# Patient Record
Sex: MALE | Race: WHITE | NOT HISPANIC OR LATINO | URBAN - METROPOLITAN AREA
[De-identification: names, ages, dates, MRNs, and addresses within clinical notes are randomized per-mention and may not be internally consistent; named-entity substitution may affect disease eponyms.]

---

## 2017-03-22 ENCOUNTER — OUTPATIENT (OUTPATIENT)
Dept: OUTPATIENT SERVICES | Facility: HOSPITAL | Age: 62
LOS: 1 days | End: 2017-03-22
Payer: COMMERCIAL

## 2017-03-22 VITALS
DIASTOLIC BLOOD PRESSURE: 70 MMHG | WEIGHT: 201.94 LBS | TEMPERATURE: 98 F | HEIGHT: 64 IN | OXYGEN SATURATION: 95 % | RESPIRATION RATE: 14 BRPM | HEART RATE: 67 BPM | SYSTOLIC BLOOD PRESSURE: 180 MMHG

## 2017-03-22 DIAGNOSIS — Z98.89 OTHER SPECIFIED POSTPROCEDURAL STATES: Chronic | ICD-10-CM

## 2017-03-22 DIAGNOSIS — S93.312A SUBLUXATION OF TARSAL JOINT OF LEFT FOOT, INITIAL ENCOUNTER: ICD-10-CM

## 2017-03-22 DIAGNOSIS — Z01.818 ENCOUNTER FOR OTHER PREPROCEDURAL EXAMINATION: ICD-10-CM

## 2017-03-22 DIAGNOSIS — Z90.89 ACQUIRED ABSENCE OF OTHER ORGANS: Chronic | ICD-10-CM

## 2017-03-22 DIAGNOSIS — M12.572 TRAUMATIC ARTHROPATHY, LEFT ANKLE AND FOOT: ICD-10-CM

## 2017-03-22 DIAGNOSIS — S93.322A SUBLUXATION OF TARSOMETATARSAL JOINT OF LEFT FOOT, INITIAL ENCOUNTER: ICD-10-CM

## 2017-03-22 DIAGNOSIS — S99.929A UNSPECIFIED INJURY OF UNSPECIFIED FOOT, INITIAL ENCOUNTER: ICD-10-CM

## 2017-03-22 LAB
ANION GAP SERPL CALC-SCNC: 8 MMOL/L — SIGNIFICANT CHANGE UP (ref 5–17)
BUN SERPL-MCNC: 24 MG/DL — HIGH (ref 7–23)
CALCIUM SERPL-MCNC: 9.6 MG/DL — SIGNIFICANT CHANGE UP (ref 8.4–10.5)
CHLORIDE SERPL-SCNC: 106 MMOL/L — SIGNIFICANT CHANGE UP (ref 96–108)
CO2 SERPL-SCNC: 32 MMOL/L — HIGH (ref 22–31)
CREAT SERPL-MCNC: 1.4 MG/DL — HIGH (ref 0.5–1.3)
GLUCOSE SERPL-MCNC: 101 MG/DL — HIGH (ref 70–99)
HCT VFR BLD CALC: 47.7 % — SIGNIFICANT CHANGE UP (ref 39–50)
HGB BLD-MCNC: 16.6 G/DL — SIGNIFICANT CHANGE UP (ref 13–17)
MCHC RBC-ENTMCNC: 29.2 PG — SIGNIFICANT CHANGE UP (ref 27–34)
MCHC RBC-ENTMCNC: 34.9 GM/DL — SIGNIFICANT CHANGE UP (ref 32–36)
MCV RBC AUTO: 83.5 FL — SIGNIFICANT CHANGE UP (ref 80–100)
PLATELET # BLD AUTO: 254 K/UL — SIGNIFICANT CHANGE UP (ref 150–400)
POTASSIUM SERPL-MCNC: 3 MMOL/L — LOW (ref 3.5–5.3)
POTASSIUM SERPL-SCNC: 3 MMOL/L — LOW (ref 3.5–5.3)
RBC # BLD: 5.71 M/UL — SIGNIFICANT CHANGE UP (ref 4.2–5.8)
RBC # FLD: 13.1 % — SIGNIFICANT CHANGE UP (ref 10.3–14.5)
SODIUM SERPL-SCNC: 146 MMOL/L — HIGH (ref 135–145)
WBC # BLD: 8.1 K/UL — SIGNIFICANT CHANGE UP (ref 3.8–10.5)
WBC # FLD AUTO: 8.1 K/UL — SIGNIFICANT CHANGE UP (ref 3.8–10.5)

## 2017-03-22 PROCEDURE — G0463: CPT

## 2017-03-22 PROCEDURE — 80048 BASIC METABOLIC PNL TOTAL CA: CPT

## 2017-03-22 PROCEDURE — 93010 ELECTROCARDIOGRAM REPORT: CPT | Mod: NC

## 2017-03-22 PROCEDURE — 93005 ELECTROCARDIOGRAM TRACING: CPT

## 2017-03-22 PROCEDURE — 85027 COMPLETE CBC AUTOMATED: CPT

## 2017-03-22 NOTE — H&P PST ADULT - FAMILY HISTORY
Father  Still living? No  Family history of stroke, Age at diagnosis: Age Unknown     Mother  Still living? No  Family history of heart attack, Age at diagnosis: Age Unknown     Aunt  Still living? No  Family history of lung cancer, Age at diagnosis: Age Unknown

## 2017-03-22 NOTE — H&P PST ADULT - HISTORY OF PRESENT ILLNESS
62 yo male is scheduled for "Left midfoot fusion/ORIF left tmt dislocation ORIF left tarsal dislocation" on 3/31/17 with Rajan Ji MD.  S/P fall 2/18/17 on stairs, states diagnosed with left foot tendon injury.  Non weight bearing, wearing CAM boot, uses cane for stability.  Denies pain, no swelling.

## 2017-03-22 NOTE — H&P PST ADULT - NEGATIVE ENMT SYMPTOMS
no nasal obstruction/no vertigo/no dysphagia/no recurrent cold sores/no post-nasal discharge/no abnormal taste sensation/no nasal congestion/no tinnitus/no ear pain/no throat pain/no sinus symptoms/no nose bleeds/no dry mouth/no nasal discharge/no hearing difficulty/no gum bleeding

## 2017-03-22 NOTE — H&P PST ADULT - PROBLEM SELECTOR PLAN 1
"left midfoot fusion/ORIF TMT dislocation ORIF left tarsal dislocation" on 3/31/17.  Patient to obtain medical clearance.  pre op instructions were reviewed and signed.    Respiratory consult requested.

## 2017-03-22 NOTE — H&P PST ADULT - NEGATIVE OPHTHALMOLOGIC SYMPTOMS
no loss of vision R/no diplopia/no blurred vision R/no photophobia/no blurred vision L/no loss of vision L

## 2017-03-22 NOTE — H&P PST ADULT - NEGATIVE ALLERGY TYPES
no reactions to food/no indoor environmental allergies/no reactions to medicines/no outdoor environmental allergies

## 2017-03-30 ENCOUNTER — TRANSCRIPTION ENCOUNTER (OUTPATIENT)
Age: 62
End: 2017-03-30

## 2017-03-30 RX ORDER — POTASSIUM CHLORIDE 20 MEQ
1 PACKET (EA) ORAL
Qty: 0 | Refills: 0 | COMMUNITY
Start: 2017-03-30

## 2017-03-31 ENCOUNTER — INPATIENT (INPATIENT)
Facility: HOSPITAL | Age: 62
LOS: 3 days | Discharge: HOME | DRG: 505 | End: 2017-04-04
Attending: ORTHOPAEDIC SURGERY | Admitting: ORTHOPAEDIC SURGERY
Payer: COMMERCIAL

## 2017-03-31 VITALS
HEART RATE: 66 BPM | TEMPERATURE: 98 F | HEIGHT: 64 IN | DIASTOLIC BLOOD PRESSURE: 87 MMHG | SYSTOLIC BLOOD PRESSURE: 196 MMHG | WEIGHT: 199.74 LBS | OXYGEN SATURATION: 100 % | RESPIRATION RATE: 20 BRPM

## 2017-03-31 DIAGNOSIS — M12.572 TRAUMATIC ARTHROPATHY, LEFT ANKLE AND FOOT: ICD-10-CM

## 2017-03-31 DIAGNOSIS — Z98.89 OTHER SPECIFIED POSTPROCEDURAL STATES: Chronic | ICD-10-CM

## 2017-03-31 DIAGNOSIS — Z90.89 ACQUIRED ABSENCE OF OTHER ORGANS: Chronic | ICD-10-CM

## 2017-03-31 DIAGNOSIS — S93.322A SUBLUXATION OF TARSOMETATARSAL JOINT OF LEFT FOOT, INITIAL ENCOUNTER: ICD-10-CM

## 2017-03-31 DIAGNOSIS — S93.312A SUBLUXATION OF TARSAL JOINT OF LEFT FOOT, INITIAL ENCOUNTER: ICD-10-CM

## 2017-03-31 DIAGNOSIS — Z01.818 ENCOUNTER FOR OTHER PREPROCEDURAL EXAMINATION: ICD-10-CM

## 2017-03-31 LAB
POTASSIUM SERPL-MCNC: 3.4 MMOL/L — LOW (ref 3.5–5.3)
POTASSIUM SERPL-SCNC: 3.4 MMOL/L — LOW (ref 3.5–5.3)

## 2017-03-31 PROCEDURE — 99223 1ST HOSP IP/OBS HIGH 75: CPT

## 2017-03-31 RX ORDER — HYDRALAZINE HCL 50 MG
10 TABLET ORAL DAILY
Qty: 0 | Refills: 0 | Status: COMPLETED | OUTPATIENT
Start: 2017-03-31 | End: 2017-03-31

## 2017-03-31 RX ORDER — ACEBUTOLOL HCL 200 MG
400 CAPSULE ORAL
Qty: 0 | Refills: 0 | Status: DISCONTINUED | OUTPATIENT
Start: 2017-03-31 | End: 2017-04-04

## 2017-03-31 RX ORDER — AMLODIPINE BESYLATE 2.5 MG/1
10 TABLET ORAL DAILY
Qty: 0 | Refills: 0 | Status: DISCONTINUED | OUTPATIENT
Start: 2017-04-01 | End: 2017-04-04

## 2017-03-31 RX ORDER — METOPROLOL TARTRATE 50 MG
2.5 TABLET ORAL ONCE
Qty: 0 | Refills: 0 | Status: COMPLETED | OUTPATIENT
Start: 2017-03-31 | End: 2017-03-31

## 2017-03-31 RX ORDER — OXYCODONE HYDROCHLORIDE 5 MG/1
10 TABLET ORAL EVERY 4 HOURS
Qty: 0 | Refills: 0 | Status: DISCONTINUED | OUTPATIENT
Start: 2017-03-31 | End: 2017-04-03

## 2017-03-31 RX ORDER — SODIUM CHLORIDE 9 MG/ML
1000 INJECTION, SOLUTION INTRAVENOUS
Qty: 0 | Refills: 0 | Status: DISCONTINUED | OUTPATIENT
Start: 2017-03-31 | End: 2017-03-31

## 2017-03-31 RX ORDER — DIPHENHYDRAMINE HCL 50 MG
25 CAPSULE ORAL ONCE
Qty: 0 | Refills: 0 | Status: COMPLETED | OUTPATIENT
Start: 2017-03-31 | End: 2017-03-31

## 2017-03-31 RX ORDER — DOCUSATE SODIUM 100 MG
100 CAPSULE ORAL
Qty: 0 | Refills: 0 | Status: DISCONTINUED | OUTPATIENT
Start: 2017-03-31 | End: 2017-04-04

## 2017-03-31 RX ORDER — POTASSIUM CHLORIDE 20 MEQ
20 PACKET (EA) ORAL DAILY
Qty: 0 | Refills: 0 | Status: DISCONTINUED | OUTPATIENT
Start: 2017-03-31 | End: 2017-04-04

## 2017-03-31 RX ORDER — CEFAZOLIN SODIUM 1 G
2000 VIAL (EA) INJECTION ONCE
Qty: 0 | Refills: 0 | Status: COMPLETED | OUTPATIENT
Start: 2017-03-31 | End: 2017-03-31

## 2017-03-31 RX ORDER — ACETAMINOPHEN 500 MG
1000 TABLET ORAL EVERY 6 HOURS
Qty: 0 | Refills: 0 | Status: COMPLETED | OUTPATIENT
Start: 2017-03-31 | End: 2017-04-01

## 2017-03-31 RX ORDER — POLYETHYLENE GLYCOL 3350 17 G/17G
17 POWDER, FOR SOLUTION ORAL ONCE
Qty: 0 | Refills: 0 | Status: DISCONTINUED | OUTPATIENT
Start: 2017-03-31 | End: 2017-04-04

## 2017-03-31 RX ORDER — MAGNESIUM HYDROXIDE 400 MG/1
30 TABLET, CHEWABLE ORAL DAILY
Qty: 0 | Refills: 0 | Status: DISCONTINUED | OUTPATIENT
Start: 2017-03-31 | End: 2017-04-04

## 2017-03-31 RX ORDER — AMLODIPINE BESYLATE 2.5 MG/1
10 TABLET ORAL ONCE
Qty: 0 | Refills: 0 | Status: COMPLETED | OUTPATIENT
Start: 2017-03-31 | End: 2017-03-31

## 2017-03-31 RX ORDER — VANCOMYCIN HCL 1 G
1500 VIAL (EA) INTRAVENOUS ONCE
Qty: 0 | Refills: 0 | Status: COMPLETED | OUTPATIENT
Start: 2017-03-31 | End: 2017-03-31

## 2017-03-31 RX ORDER — SODIUM CHLORIDE 9 MG/ML
1000 INJECTION, SOLUTION INTRAVENOUS
Qty: 0 | Refills: 0 | Status: DISCONTINUED | OUTPATIENT
Start: 2017-03-31 | End: 2017-04-01

## 2017-03-31 RX ORDER — ENOXAPARIN SODIUM 100 MG/ML
40 INJECTION SUBCUTANEOUS DAILY
Qty: 0 | Refills: 0 | Status: DISCONTINUED | OUTPATIENT
Start: 2017-04-01 | End: 2017-04-04

## 2017-03-31 RX ORDER — ACETAMINOPHEN 500 MG
1000 TABLET ORAL EVERY 8 HOURS
Qty: 0 | Refills: 0 | Status: DISCONTINUED | OUTPATIENT
Start: 2017-04-01 | End: 2017-04-04

## 2017-03-31 RX ORDER — LOSARTAN POTASSIUM 100 MG/1
100 TABLET, FILM COATED ORAL DAILY
Qty: 0 | Refills: 0 | Status: DISCONTINUED | OUTPATIENT
Start: 2017-04-02 | End: 2017-04-04

## 2017-03-31 RX ORDER — HYDROMORPHONE HYDROCHLORIDE 2 MG/ML
0.5 INJECTION INTRAMUSCULAR; INTRAVENOUS; SUBCUTANEOUS
Qty: 0 | Refills: 0 | Status: DISCONTINUED | OUTPATIENT
Start: 2017-03-31 | End: 2017-03-31

## 2017-03-31 RX ORDER — ONDANSETRON 8 MG/1
4 TABLET, FILM COATED ORAL ONCE
Qty: 0 | Refills: 0 | Status: DISCONTINUED | OUTPATIENT
Start: 2017-03-31 | End: 2017-03-31

## 2017-03-31 RX ORDER — OXYCODONE HYDROCHLORIDE 5 MG/1
5 TABLET ORAL EVERY 4 HOURS
Qty: 0 | Refills: 0 | Status: DISCONTINUED | OUTPATIENT
Start: 2017-03-31 | End: 2017-04-03

## 2017-03-31 RX ORDER — HYDROMORPHONE HYDROCHLORIDE 2 MG/ML
0.5 INJECTION INTRAMUSCULAR; INTRAVENOUS; SUBCUTANEOUS
Qty: 0 | Refills: 0 | Status: DISCONTINUED | OUTPATIENT
Start: 2017-03-31 | End: 2017-04-03

## 2017-03-31 RX ORDER — CEFAZOLIN SODIUM 1 G
2000 VIAL (EA) INJECTION EVERY 8 HOURS
Qty: 0 | Refills: 0 | Status: DISCONTINUED | OUTPATIENT
Start: 2017-03-31 | End: 2017-03-31

## 2017-03-31 RX ADMIN — SODIUM CHLORIDE 80 MILLILITER(S): 9 INJECTION, SOLUTION INTRAVENOUS at 19:08

## 2017-03-31 RX ADMIN — AMLODIPINE BESYLATE 10 MILLIGRAM(S): 2.5 TABLET ORAL at 22:59

## 2017-03-31 RX ADMIN — Medication 0.1 MILLIGRAM(S): at 22:59

## 2017-03-31 RX ADMIN — Medication 1000 MILLIGRAM(S): at 23:35

## 2017-03-31 RX ADMIN — Medication 20 MILLIEQUIVALENT(S): at 21:02

## 2017-03-31 RX ADMIN — HYDROMORPHONE HYDROCHLORIDE 0.5 MILLIGRAM(S): 2 INJECTION INTRAMUSCULAR; INTRAVENOUS; SUBCUTANEOUS at 16:03

## 2017-03-31 RX ADMIN — Medication 400 MILLIGRAM(S): at 19:07

## 2017-03-31 RX ADMIN — HYDROMORPHONE HYDROCHLORIDE 0.5 MILLIGRAM(S): 2 INJECTION INTRAMUSCULAR; INTRAVENOUS; SUBCUTANEOUS at 16:40

## 2017-03-31 RX ADMIN — Medication 100 MILLIGRAM(S): at 20:48

## 2017-03-31 RX ADMIN — Medication 300 MILLIGRAM(S): at 19:35

## 2017-03-31 RX ADMIN — Medication 1000 MILLIGRAM(S): at 21:06

## 2017-03-31 RX ADMIN — Medication 25 MILLIGRAM(S): at 19:07

## 2017-03-31 RX ADMIN — Medication 10 MILLIGRAM(S): at 16:00

## 2017-03-31 RX ADMIN — Medication 2.5 MILLIGRAM(S): at 15:05

## 2017-03-31 RX ADMIN — HYDROMORPHONE HYDROCHLORIDE 0.5 MILLIGRAM(S): 2 INJECTION INTRAMUSCULAR; INTRAVENOUS; SUBCUTANEOUS at 16:20

## 2017-03-31 RX ADMIN — HYDROMORPHONE HYDROCHLORIDE 0.5 MILLIGRAM(S): 2 INJECTION INTRAMUSCULAR; INTRAVENOUS; SUBCUTANEOUS at 15:55

## 2017-03-31 RX ADMIN — SODIUM CHLORIDE 100 MILLILITER(S): 9 INJECTION, SOLUTION INTRAVENOUS at 15:00

## 2017-03-31 RX ADMIN — HYDROMORPHONE HYDROCHLORIDE 0.5 MILLIGRAM(S): 2 INJECTION INTRAMUSCULAR; INTRAVENOUS; SUBCUTANEOUS at 15:17

## 2017-03-31 NOTE — PROGRESS NOTE ADULT - SUBJECTIVE AND OBJECTIVE BOX
POST OPERATIVE DAY #: 0    61y Male  s/p  Left    foot fusion                    SUBJECTIVE: Patient seen and examined at bedside.     Pain:  well controlled       Denies CP, SOB, N/V/D, weakness, numbness   No new complains     OBJECTIVE:     Vital Signs Last 24 Hrs  T(C): 36.8, Max: 37 (03-31 @ 14:59)  T(F): 98.2, Max: 98.6 (03-31 @ 14:59)  HR: 90 (64 - 94)  BP: 180/78 (174/84 - 209/81)  BP(mean): --  RR: 18 (13 - 20)  SpO2: 99% (95% - 100%)      Left arm: + papular erymatous rash extending proximately, warm to touch    Affected extremity: LLE         Splint: clean/dry/intact          Sensation: intact to light touch          Motor exam: wiggling toes well.           warm, well-perfused; capillary refill < 3 seconds                  MEDICATIONS:  Infection prophylaxis:  ceFAZolin   IVPB 2000milliGRAM(s) IV Intermittent every 8 hours    Pain management:  oxyCODONE IR 10milliGRAM(s) Oral every 4 hours PRN  oxyCODONE IR 5milliGRAM(s) Oral every 4 hours PRN  HYDROmorphone  Injectable 0.5milliGRAM(s) IV Push every 3 hours PRN  acetaminophen  IVPB. 1000milliGRAM(s) IV Intermittent every 6 hours    DVT prophylaxis:     Lovenox 40mg SQ daily    RADIOLOGY & ADDITIONAL STUDIES:    ASSESSMENT AND PLAN:     -Left arm rash: Benadryl   - Analgesic pain control  - DVT prophylaxis: Lovenox 40mg SQ daily    SCDs       - Antibiotics:  change Ancef to Vancomycin for 24 hours   -  PT/OT: Weight Bearing Status:  Nonweight bearing, OOBTC       -  Incentive spirometry  - IVF  - Advance diet as tolerated  -  Follow up labs  -  Disposition:  Subacute Rehab

## 2017-03-31 NOTE — CONSULT NOTE ADULT - ASSESSMENT
61 male s/p    1. foot surgery: opiates prn + bowel rx    2. uncontrolled HTN: may need to give clonidine dose earlier. continue other home meds. hydralazine 10 mg iv prn SBP>180    3. CKD III / hypokalemia: renal dose meds. f/up BMP. replete lytes prn    4. lovenox for dvt ppx    5. rehab possibly tomorrow or Monday.  d/w ortho

## 2017-03-31 NOTE — CONSULT NOTE ADULT - SUBJECTIVE AND OBJECTIVE BOX
61 male s/p foot surgery for non-healing fracture due to poor NWB compliance prior.  Pain 5/10.  Seen in PACU - a bit drowsy.  SBP 190s-200s.    Allergies/Medications:   Allergies:        Allergies:  	No Known Allergies:     Home Medications:   * Patient Currently Takes Medications as of 22-Mar-2017 13:14 documented in Prescription Writer  · 	acebutolol 400 mg oral capsule: Last Dose Taken:  ,  orally 2 times a day  · 	Hyzaar 100 mg-25 mg oral tablet: Last Dose Taken:  , 1 tab(s) orally once a day  · 	Norvasc 10 mg oral tablet: Last Dose Taken:  , 1 tab(s) orally once a day  · 	cloNIDine 0.1 mg oral tablet: Last Dose Taken:  , 1 tab(s) orally 2 times a day at bedtime           . .    PMH/PSH/FH/SH:   Past Medical History:  Foot injury  left  Hypertension    Obesity (BMI 30.0-34.9)    Sleep apnea  doesn't use CPAP.    Past Surgical History:  H/O knee surgery  left, 2013  H/O oral surgery    History of tonsillectomy  childhood.    Family History:  Father  Still living? No  Family history of stroke, Age at diagnosis: Age Unknown     Mother  Still living? No  Family history of heart attack, Age at diagnosis: Age Unknown     Aunt  Still living? No  Family history of lung cancer, Age at diagnosis: Age Unknown.    Social History:  · Marital Status		  · Occupation	retired	  · Lives With	children; spouse	        Creatinine 1.4 earlier this month.  No labs today.

## 2017-04-01 DIAGNOSIS — N18.9 CHRONIC KIDNEY DISEASE, UNSPECIFIED: ICD-10-CM

## 2017-04-01 DIAGNOSIS — I10 ESSENTIAL (PRIMARY) HYPERTENSION: ICD-10-CM

## 2017-04-01 DIAGNOSIS — M79.673 PAIN IN UNSPECIFIED FOOT: ICD-10-CM

## 2017-04-01 LAB
ANION GAP SERPL CALC-SCNC: 7 MMOL/L — SIGNIFICANT CHANGE UP (ref 5–17)
BUN SERPL-MCNC: 11 MG/DL — SIGNIFICANT CHANGE UP (ref 7–23)
CALCIUM SERPL-MCNC: 9.3 MG/DL — SIGNIFICANT CHANGE UP (ref 8.4–10.5)
CHLORIDE SERPL-SCNC: 106 MMOL/L — SIGNIFICANT CHANGE UP (ref 96–108)
CO2 SERPL-SCNC: 28 MMOL/L — SIGNIFICANT CHANGE UP (ref 22–31)
CREAT SERPL-MCNC: 1.06 MG/DL — SIGNIFICANT CHANGE UP (ref 0.5–1.3)
GLUCOSE SERPL-MCNC: 101 MG/DL — HIGH (ref 70–99)
HCT VFR BLD CALC: 41.8 % — SIGNIFICANT CHANGE UP (ref 39–50)
HGB BLD-MCNC: 14.1 G/DL — SIGNIFICANT CHANGE UP (ref 13–17)
MCHC RBC-ENTMCNC: 29.1 PG — SIGNIFICANT CHANGE UP (ref 27–34)
MCHC RBC-ENTMCNC: 33.8 GM/DL — SIGNIFICANT CHANGE UP (ref 32–36)
MCV RBC AUTO: 86.3 FL — SIGNIFICANT CHANGE UP (ref 80–100)
PLATELET # BLD AUTO: 236 K/UL — SIGNIFICANT CHANGE UP (ref 150–400)
POTASSIUM SERPL-MCNC: 3.5 MMOL/L — SIGNIFICANT CHANGE UP (ref 3.5–5.3)
POTASSIUM SERPL-SCNC: 3.5 MMOL/L — SIGNIFICANT CHANGE UP (ref 3.5–5.3)
RBC # BLD: 4.84 M/UL — SIGNIFICANT CHANGE UP (ref 4.2–5.8)
RBC # FLD: 13.4 % — SIGNIFICANT CHANGE UP (ref 10.3–14.5)
SODIUM SERPL-SCNC: 141 MMOL/L — SIGNIFICANT CHANGE UP (ref 135–145)
WBC # BLD: 13 K/UL — HIGH (ref 3.8–10.5)
WBC # FLD AUTO: 13 K/UL — HIGH (ref 3.8–10.5)

## 2017-04-01 PROCEDURE — 99232 SBSQ HOSP IP/OBS MODERATE 35: CPT

## 2017-04-01 RX ORDER — HYDRALAZINE HCL 50 MG
10 TABLET ORAL
Qty: 0 | Refills: 0 | Status: DISCONTINUED | OUTPATIENT
Start: 2017-04-01 | End: 2017-04-02

## 2017-04-01 RX ORDER — DIPHENHYDRAMINE HCL 50 MG
25 CAPSULE ORAL EVERY 8 HOURS
Qty: 0 | Refills: 0 | Status: DISCONTINUED | OUTPATIENT
Start: 2017-04-01 | End: 2017-04-04

## 2017-04-01 RX ADMIN — Medication 400 MILLIGRAM(S): at 00:34

## 2017-04-01 RX ADMIN — OXYCODONE HYDROCHLORIDE 10 MILLIGRAM(S): 5 TABLET ORAL at 23:30

## 2017-04-01 RX ADMIN — Medication 400 MILLIGRAM(S): at 06:00

## 2017-04-01 RX ADMIN — Medication 1000 MILLIGRAM(S): at 19:26

## 2017-04-01 RX ADMIN — OXYCODONE HYDROCHLORIDE 5 MILLIGRAM(S): 5 TABLET ORAL at 19:30

## 2017-04-01 RX ADMIN — OXYCODONE HYDROCHLORIDE 10 MILLIGRAM(S): 5 TABLET ORAL at 23:00

## 2017-04-01 RX ADMIN — ENOXAPARIN SODIUM 40 MILLIGRAM(S): 100 INJECTION SUBCUTANEOUS at 08:53

## 2017-04-01 RX ADMIN — Medication 0.1 MILLIGRAM(S): at 06:00

## 2017-04-01 RX ADMIN — OXYCODONE HYDROCHLORIDE 10 MILLIGRAM(S): 5 TABLET ORAL at 00:12

## 2017-04-01 RX ADMIN — Medication 400 MILLIGRAM(S): at 18:02

## 2017-04-01 RX ADMIN — Medication 400 MILLIGRAM(S): at 06:01

## 2017-04-01 RX ADMIN — Medication 100 MILLIGRAM(S): at 06:00

## 2017-04-01 RX ADMIN — Medication 1000 MILLIGRAM(S): at 19:23

## 2017-04-01 RX ADMIN — Medication 0.1 MILLIGRAM(S): at 18:02

## 2017-04-01 RX ADMIN — Medication 20 MILLIEQUIVALENT(S): at 11:29

## 2017-04-01 RX ADMIN — OXYCODONE HYDROCHLORIDE 10 MILLIGRAM(S): 5 TABLET ORAL at 11:27

## 2017-04-01 RX ADMIN — OXYCODONE HYDROCHLORIDE 5 MILLIGRAM(S): 5 TABLET ORAL at 15:41

## 2017-04-01 RX ADMIN — OXYCODONE HYDROCHLORIDE 5 MILLIGRAM(S): 5 TABLET ORAL at 19:26

## 2017-04-01 RX ADMIN — Medication 1000 MILLIGRAM(S): at 11:27

## 2017-04-01 RX ADMIN — OXYCODONE HYDROCHLORIDE 5 MILLIGRAM(S): 5 TABLET ORAL at 16:30

## 2017-04-01 RX ADMIN — Medication 100 MILLIGRAM(S): at 18:02

## 2017-04-01 RX ADMIN — Medication 1000 MILLIGRAM(S): at 11:31

## 2017-04-01 RX ADMIN — OXYCODONE HYDROCHLORIDE 5 MILLIGRAM(S): 5 TABLET ORAL at 07:36

## 2017-04-01 RX ADMIN — Medication 25 MILLIGRAM(S): at 10:35

## 2017-04-01 RX ADMIN — OXYCODONE HYDROCHLORIDE 5 MILLIGRAM(S): 5 TABLET ORAL at 08:25

## 2017-04-01 RX ADMIN — Medication 1000 MILLIGRAM(S): at 06:01

## 2017-04-01 NOTE — OCCUPATIONAL THERAPY INITIAL EVALUATION ADULT - ADDITIONAL COMMENTS
Lives with wife. 2 steps to enter with handrail. No steps inside. Has both a tub with curtains and small stall shower. Pt was given crutches after fall but said he was never trained and didn't use. has a knee scooter rental that he returned.

## 2017-04-01 NOTE — PROGRESS NOTE ADULT - SUBJECTIVE AND OBJECTIVE BOX
Pt seen and examined. Doing well.  PE: LLE  Dsg c/d/i. +Flex and ext toes; SPLT toes; toes WWP  A/P: s/p L Midfoot fusion  NWB LLE in splint  Ice/elelvation  Lovenox   Hold NSAIDs for fusion  Pain control  f/up med recs  D/c pending PT/OT eval  f/up 2 weeks. leave dressing/splint in place until f/up appt

## 2017-04-01 NOTE — PROGRESS NOTE ADULT - ASSESSMENT
Patient is 62 yo male with hx of left Foot injury, Hypertension, Obesity (BMI 30.0-34.9), and Sleep apnea  doesn't use CPAP presenting with .

## 2017-04-01 NOTE — PHYSICAL THERAPY INITIAL EVALUATION ADULT - ADDITIONAL COMMENTS
Pt has 2 CATALINO +HR into private home. No steps inside home. Pt owns bilateral AC's. Pt had knee rental scooter but he returned it.

## 2017-04-01 NOTE — PHYSICAL THERAPY INITIAL EVALUATION ADULT - GAIT TRAINING, PT EVAL
ambulate with a RW for 50 feet NWB LLE and cgAx1 in 3-5 days. 2 steps HR/AC with cgA x 1 in 3-5 days

## 2017-04-01 NOTE — PHYSICAL THERAPY INITIAL EVALUATION ADULT - RANGE OF MOTION EXAMINATION, REHAB EVAL
Right LE ROM was WFL (within functional limits)/deficits as listed below/LLE n/t due to recent sx/bilateral upper extremity ROM was WNL (within normal limits)

## 2017-04-01 NOTE — PROGRESS NOTE ADULT - SUBJECTIVE AND OBJECTIVE BOX
POST OPERATIVE DAY #:  [ 1]   STATUS POST: [X ] Left [ ] Right  [ ]TKA [ ]THR    Foot fusion w orif TMT    Patient is a 61y old  Male who presents with a chief complaint of torn tendon left foot , having pain left foot (30 Mar 2017 17:59)      Pain well controlled    OBJECTIVE:     Vital Signs Last 24 Hrs  T(C): 37.1, Max: 37.1 (04-01 @ 07:28)  T(F): 98.8, Max: 98.8 (04-01 @ 07:28)  HR: 78 (64 - 94)  BP: 169/80 (160/83 - 209/81)  BP(mean): --  RR: 16 (13 - 18)  SpO2: 94% (94% - 100%)    Affected extremity:          Dressing:  splint clean/dry/intact  [ ] Other:           Sensation; intact to light touch            Motor exam: Toes warm and mobile well perfused;  +capillary refill         No calf tenderness bilateral LE's    LABS:                        14.1   13.0  )-----------( 236      ( 01 Apr 2017 06:58 )             41.8     01 Apr 2017 06:58    141    |  106    |  11     ----------------------------<  101    3.5     |  28     |  1.06     Ca    9.3        01 Apr 2017 06:58              A/P :    -    Analgesics PRN  -    DVT ppx: [ ]ASA81 [ ] PGH299 [ ] Lovenox [ ] Coumadin   [ ] Eliquis   -    AM labs ok  -    Weight bearing status: WBAT [ ]        PWB    [ ]     TTWB  [ ]      NWB  [ X]  -    Physical Therapy  -    Occupational Therapy  -    Dispo: Home [ ]     Rehab [ ]      PAULY [ X]      To be determined [ ]

## 2017-04-01 NOTE — PROGRESS NOTE ADULT - SUBJECTIVE AND OBJECTIVE BOX
Pain well controlled.  Offers no other complaints    Constitutional: No fever, fatigue or weight loss.  Skin: No rash.  Eyes: No recent vision problems or eye pain.  ENT: No congestion, ear pain, or sore throat.  Endocrine: No thyroid problems.  Cardiovascular: No chest pain or palpation.  Respiratory: No cough, shortness of breath, congestion, or wheezing.  Gastrointestinal: No abdominal pain, nausea, vomiting, or diarrhea.  Genitourinary: No dysuria.  Musculoskeletal: No joint swelling.  Neurologic: No headache.      MEDICATIONS  (STANDING):  enoxaparin Injectable 40milliGRAM(s) SubCutaneous daily  lactated ringers. 1000milliLiter(s) IV Continuous <Continuous>  docusate sodium 100milliGRAM(s) Oral two times a day  cloNIDine 0.1milliGRAM(s) Oral two times a day  amLODIPine   Tablet 10milliGRAM(s) Oral daily  potassium chloride    Tablet ER 20milliEquivalent(s) Oral daily  acetaminophen   Tablet. 1000milliGRAM(s) Oral every 8 hours  acebutolol 400milliGRAM(s) Oral two times a day    MEDICATIONS  (PRN):  oxyCODONE IR 10milliGRAM(s) Oral every 4 hours PRN Moderate Pain  oxyCODONE IR 5milliGRAM(s) Oral every 4 hours PRN Mild Pain  HYDROmorphone  Injectable 0.5milliGRAM(s) IV Push every 3 hours PRN Moderate Pain  aluminum hydroxide/magnesium hydroxide/simethicone Suspension 30milliLiter(s) Oral four times a day PRN Indigestion  magnesium hydroxide Suspension 30milliLiter(s) Oral daily PRN Constipation  polyethylene glycol 3350 17Gram(s) Oral once PRN IF No BM by D2      Vital Signs Last 24 Hrs  T(C): 37.1, Max: 37.1 (04-01 @ 07:28)  T(F): 98.8, Max: 98.8 (04-01 @ 07:28)  HR: 78 (64 - 94)  BP: 169/80 (160/83 - 209/81)  BP(mean): --  RR: 16 (13 - 18)  SpO2: 94% (94% - 100%)      PHYSICAL EXAM-  GENERAL: NAD, well-groomed, well-developed  HEAD:  Atraumatic, Normocephalic  EYES: EOMI, PERRLA, conjunctiva and sclera clear  NECK: Supple, No JVD, Normal thyroid  NERVOUS SYSTEM:  Alert & Oriented X3, Motor Strength 5/5 B/L upper and lower extremities; DTRs 2+ intact and symmetric  CHEST/LUNG: Clear to percussion bilaterally; No rales, rhonchi, wheezing, or rubs  HEART: Regular rate and rhythm; No murmurs, rubs, or gallops  ABDOMEN: Soft, Nontender, Nondistended; Bowel sounds present  EXTREMITIES:  2+ Peripheral Pulses, No clubbing, cyanosis, or edema  SKIN: No rashes or lesions

## 2017-04-02 LAB
ANION GAP SERPL CALC-SCNC: 6 MMOL/L — SIGNIFICANT CHANGE UP (ref 5–17)
BUN SERPL-MCNC: 18 MG/DL — SIGNIFICANT CHANGE UP (ref 7–23)
CALCIUM SERPL-MCNC: 8.5 MG/DL — SIGNIFICANT CHANGE UP (ref 8.4–10.5)
CHLORIDE SERPL-SCNC: 108 MMOL/L — SIGNIFICANT CHANGE UP (ref 96–108)
CO2 SERPL-SCNC: 31 MMOL/L — SIGNIFICANT CHANGE UP (ref 22–31)
CREAT SERPL-MCNC: 1.05 MG/DL — SIGNIFICANT CHANGE UP (ref 0.5–1.3)
GLUCOSE SERPL-MCNC: 96 MG/DL — SIGNIFICANT CHANGE UP (ref 70–99)
HCT VFR BLD CALC: 41.7 % — SIGNIFICANT CHANGE UP (ref 39–50)
HGB BLD-MCNC: 14.7 G/DL — SIGNIFICANT CHANGE UP (ref 13–17)
MCHC RBC-ENTMCNC: 30.9 PG — SIGNIFICANT CHANGE UP (ref 27–34)
MCHC RBC-ENTMCNC: 35.3 GM/DL — SIGNIFICANT CHANGE UP (ref 32–36)
MCV RBC AUTO: 87.4 FL — SIGNIFICANT CHANGE UP (ref 80–100)
PLATELET # BLD AUTO: 206 K/UL — SIGNIFICANT CHANGE UP (ref 150–400)
POTASSIUM SERPL-MCNC: 3.1 MMOL/L — LOW (ref 3.5–5.3)
POTASSIUM SERPL-SCNC: 3.1 MMOL/L — LOW (ref 3.5–5.3)
RBC # BLD: 4.77 M/UL — SIGNIFICANT CHANGE UP (ref 4.2–5.8)
RBC # FLD: 13.5 % — SIGNIFICANT CHANGE UP (ref 10.3–14.5)
SODIUM SERPL-SCNC: 145 MMOL/L — SIGNIFICANT CHANGE UP (ref 135–145)
WBC # BLD: 11.6 K/UL — HIGH (ref 3.8–10.5)
WBC # FLD AUTO: 11.6 K/UL — HIGH (ref 3.8–10.5)

## 2017-04-02 PROCEDURE — 99232 SBSQ HOSP IP/OBS MODERATE 35: CPT

## 2017-04-02 RX ORDER — HYDRALAZINE HCL 50 MG
25 TABLET ORAL EVERY 6 HOURS
Qty: 0 | Refills: 0 | Status: DISCONTINUED | OUTPATIENT
Start: 2017-04-02 | End: 2017-04-03

## 2017-04-02 RX ORDER — POTASSIUM CHLORIDE 20 MEQ
20 PACKET (EA) ORAL ONCE
Qty: 0 | Refills: 0 | Status: COMPLETED | OUTPATIENT
Start: 2017-04-02 | End: 2017-04-02

## 2017-04-02 RX ADMIN — Medication 0.1 MILLIGRAM(S): at 06:40

## 2017-04-02 RX ADMIN — Medication 100 MILLIGRAM(S): at 06:40

## 2017-04-02 RX ADMIN — OXYCODONE HYDROCHLORIDE 5 MILLIGRAM(S): 5 TABLET ORAL at 04:00

## 2017-04-02 RX ADMIN — Medication 400 MILLIGRAM(S): at 06:41

## 2017-04-02 RX ADMIN — OXYCODONE HYDROCHLORIDE 5 MILLIGRAM(S): 5 TABLET ORAL at 21:07

## 2017-04-02 RX ADMIN — Medication 1000 MILLIGRAM(S): at 20:06

## 2017-04-02 RX ADMIN — OXYCODONE HYDROCHLORIDE 5 MILLIGRAM(S): 5 TABLET ORAL at 04:30

## 2017-04-02 RX ADMIN — Medication 0.1 MILLIGRAM(S): at 18:03

## 2017-04-02 RX ADMIN — LOSARTAN POTASSIUM 100 MILLIGRAM(S): 100 TABLET, FILM COATED ORAL at 06:40

## 2017-04-02 RX ADMIN — OXYCODONE HYDROCHLORIDE 5 MILLIGRAM(S): 5 TABLET ORAL at 20:06

## 2017-04-02 RX ADMIN — Medication 20 MILLIEQUIVALENT(S): at 11:37

## 2017-04-02 RX ADMIN — OXYCODONE HYDROCHLORIDE 10 MILLIGRAM(S): 5 TABLET ORAL at 11:51

## 2017-04-02 RX ADMIN — Medication 1000 MILLIGRAM(S): at 06:41

## 2017-04-02 RX ADMIN — Medication 400 MILLIGRAM(S): at 18:04

## 2017-04-02 RX ADMIN — Medication 25 MILLIGRAM(S): at 18:03

## 2017-04-02 RX ADMIN — Medication 100 MILLIGRAM(S): at 18:03

## 2017-04-02 RX ADMIN — AMLODIPINE BESYLATE 10 MILLIGRAM(S): 2.5 TABLET ORAL at 18:03

## 2017-04-02 RX ADMIN — OXYCODONE HYDROCHLORIDE 10 MILLIGRAM(S): 5 TABLET ORAL at 08:40

## 2017-04-02 RX ADMIN — Medication 1000 MILLIGRAM(S): at 11:41

## 2017-04-02 RX ADMIN — OXYCODONE HYDROCHLORIDE 10 MILLIGRAM(S): 5 TABLET ORAL at 12:35

## 2017-04-02 RX ADMIN — OXYCODONE HYDROCHLORIDE 10 MILLIGRAM(S): 5 TABLET ORAL at 07:59

## 2017-04-02 RX ADMIN — OXYCODONE HYDROCHLORIDE 10 MILLIGRAM(S): 5 TABLET ORAL at 17:00

## 2017-04-02 RX ADMIN — Medication 1000 MILLIGRAM(S): at 11:37

## 2017-04-02 RX ADMIN — Medication 25 MILLIGRAM(S): at 11:47

## 2017-04-02 RX ADMIN — ENOXAPARIN SODIUM 40 MILLIGRAM(S): 100 INJECTION SUBCUTANEOUS at 08:09

## 2017-04-02 RX ADMIN — Medication 20 MILLIEQUIVALENT(S): at 11:40

## 2017-04-02 RX ADMIN — Medication 10 MILLIGRAM(S): at 06:40

## 2017-04-02 RX ADMIN — Medication 1000 MILLIGRAM(S): at 20:33

## 2017-04-02 RX ADMIN — OXYCODONE HYDROCHLORIDE 10 MILLIGRAM(S): 5 TABLET ORAL at 16:09

## 2017-04-02 NOTE — PROGRESS NOTE ADULT - SUBJECTIVE AND OBJECTIVE BOX
Pain well controlled.  Offers no other complaints      ROS  Constitutional: No fever, fatigue or weight loss.  Skin: No rash.  Eyes: No recent vision problems or eye pain.  ENT: No congestion, ear pain, or sore throat.  Endocrine: No thyroid problems.  Cardiovascular: No chest pain or palpation.  Respiratory: No cough, shortness of breath, congestion, or wheezing.  Gastrointestinal: No abdominal pain, nausea, vomiting, or diarrhea.  Genitourinary: No dysuria.  Musculoskeletal: No joint swelling.  Neurologic: No headache.      Vital Signs Last 24 Hrs  T(C): 36.9, Max: 37.2 (04-01 @ 19:00)  T(F): 98.4, Max: 98.9 (04-01 @ 19:00)  HR: 71 (71 - 79)  BP: 193/88 (153/67 - 193/88)  BP(mean): --  RR: 15 (15 - 18)  SpO2: 93% (90% - 96%)    PHYSICAL EXAM-  GENERAL: NAD, well-groomed, well-developed  HEAD:  Atraumatic, Normocephalic  EYES: EOMI, PERRLA, conjunctiva and sclera clear  NECK: Supple, No JVD, Normal thyroid  NERVOUS SYSTEM:  Alert & Oriented X3, Motor and sensory intact.  Moving toes ok.    CHEST/LUNG: Clear to percussion bilaterally; No rales, rhonchi, wheezing, or rubs  HEART: Regular rate and rhythm; No murmurs, rubs, or gallops  ABDOMEN: Soft, Nontender, Nondistended; Bowel sounds present  EXTREMITIES:  2+ Peripheral Pulses, No clubbing, cyanosis, or edema  SKIN: No rashes or lesions

## 2017-04-02 NOTE — PROGRESS NOTE ADULT - ASSESSMENT
Patient is 60 yo male with hx of left Foot injury, Hypertension, Obesity (BMI 30.0-34.9), and Sleep apnea  doesn't use CPAP presenting with .

## 2017-04-02 NOTE — PROGRESS NOTE ADULT - SUBJECTIVE AND OBJECTIVE BOX
Pt seen and examined. Doing well.  POD #2  PE: LLE  Dsg c/d/i. +Flex and ext toes; SPLT toes; toes WWP  A/P: s/p L Midfoot fusion  NWB LLE in splint  Ice/elelvation  Lovenox   Hold NSAIDs for fusion  Pain control  f/up med recs  D/c pending PT/OT eval  f/up 2 weeks. leave dressing/splint in place until f/up appt

## 2017-04-03 LAB
ANION GAP SERPL CALC-SCNC: 7 MMOL/L — SIGNIFICANT CHANGE UP (ref 5–17)
BUN SERPL-MCNC: 16 MG/DL — SIGNIFICANT CHANGE UP (ref 7–23)
CALCIUM SERPL-MCNC: 8.9 MG/DL — SIGNIFICANT CHANGE UP (ref 8.4–10.5)
CHLORIDE SERPL-SCNC: 107 MMOL/L — SIGNIFICANT CHANGE UP (ref 96–108)
CO2 SERPL-SCNC: 29 MMOL/L — SIGNIFICANT CHANGE UP (ref 22–31)
CREAT SERPL-MCNC: 0.97 MG/DL — SIGNIFICANT CHANGE UP (ref 0.5–1.3)
GLUCOSE SERPL-MCNC: 105 MG/DL — HIGH (ref 70–99)
HCT VFR BLD CALC: 43.2 % — SIGNIFICANT CHANGE UP (ref 39–50)
HGB BLD-MCNC: 13.9 G/DL — SIGNIFICANT CHANGE UP (ref 13–17)
MCHC RBC-ENTMCNC: 27.8 PG — SIGNIFICANT CHANGE UP (ref 27–34)
MCHC RBC-ENTMCNC: 32.2 GM/DL — SIGNIFICANT CHANGE UP (ref 32–36)
MCV RBC AUTO: 86.5 FL — SIGNIFICANT CHANGE UP (ref 80–100)
PLATELET # BLD AUTO: 210 K/UL — SIGNIFICANT CHANGE UP (ref 150–400)
POTASSIUM SERPL-MCNC: 3.2 MMOL/L — LOW (ref 3.5–5.3)
POTASSIUM SERPL-SCNC: 3.2 MMOL/L — LOW (ref 3.5–5.3)
RBC # BLD: 4.99 M/UL — SIGNIFICANT CHANGE UP (ref 4.2–5.8)
RBC # FLD: 13.4 % — SIGNIFICANT CHANGE UP (ref 10.3–14.5)
SODIUM SERPL-SCNC: 143 MMOL/L — SIGNIFICANT CHANGE UP (ref 135–145)
WBC # BLD: 8.8 K/UL — SIGNIFICANT CHANGE UP (ref 3.8–10.5)
WBC # FLD AUTO: 8.8 K/UL — SIGNIFICANT CHANGE UP (ref 3.8–10.5)

## 2017-04-03 PROCEDURE — 99232 SBSQ HOSP IP/OBS MODERATE 35: CPT

## 2017-04-03 RX ORDER — OXYCODONE HYDROCHLORIDE 5 MG/1
10 TABLET ORAL EVERY 6 HOURS
Qty: 0 | Refills: 0 | Status: DISCONTINUED | OUTPATIENT
Start: 2017-04-03 | End: 2017-04-04

## 2017-04-03 RX ORDER — ENOXAPARIN SODIUM 100 MG/ML
40 INJECTION SUBCUTANEOUS
Qty: 28 | Refills: 0 | OUTPATIENT
Start: 2017-04-03 | End: 2017-05-01

## 2017-04-03 RX ORDER — POTASSIUM CHLORIDE 20 MEQ
20 PACKET (EA) ORAL ONCE
Qty: 0 | Refills: 0 | Status: COMPLETED | OUTPATIENT
Start: 2017-04-03 | End: 2017-04-03

## 2017-04-03 RX ORDER — HYDRALAZINE HCL 50 MG
10 TABLET ORAL ONCE
Qty: 0 | Refills: 0 | Status: DISCONTINUED | OUTPATIENT
Start: 2017-04-03 | End: 2017-04-03

## 2017-04-03 RX ORDER — OXYCODONE HYDROCHLORIDE 5 MG/1
10 TABLET ORAL ONCE
Qty: 0 | Refills: 0 | Status: DISCONTINUED | OUTPATIENT
Start: 2017-04-03 | End: 2017-04-03

## 2017-04-03 RX ORDER — HYDRALAZINE HCL 50 MG
5 TABLET ORAL ONCE
Qty: 0 | Refills: 0 | Status: COMPLETED | OUTPATIENT
Start: 2017-04-03 | End: 2017-04-03

## 2017-04-03 RX ORDER — OXYCODONE HYDROCHLORIDE 5 MG/1
5 TABLET ORAL EVERY 6 HOURS
Qty: 0 | Refills: 0 | Status: DISCONTINUED | OUTPATIENT
Start: 2017-04-03 | End: 2017-04-04

## 2017-04-03 RX ORDER — HYDRALAZINE HCL 50 MG
50 TABLET ORAL EVERY 8 HOURS
Qty: 0 | Refills: 0 | Status: DISCONTINUED | OUTPATIENT
Start: 2017-04-03 | End: 2017-04-04

## 2017-04-03 RX ADMIN — OXYCODONE HYDROCHLORIDE 10 MILLIGRAM(S): 5 TABLET ORAL at 20:23

## 2017-04-03 RX ADMIN — Medication 50 MILLIGRAM(S): at 22:02

## 2017-04-03 RX ADMIN — OXYCODONE HYDROCHLORIDE 10 MILLIGRAM(S): 5 TABLET ORAL at 09:58

## 2017-04-03 RX ADMIN — Medication 1000 MILLIGRAM(S): at 20:26

## 2017-04-03 RX ADMIN — Medication 1000 MILLIGRAM(S): at 20:22

## 2017-04-03 RX ADMIN — Medication 100 MILLIGRAM(S): at 17:23

## 2017-04-03 RX ADMIN — Medication 20 MILLIEQUIVALENT(S): at 11:53

## 2017-04-03 RX ADMIN — OXYCODONE HYDROCHLORIDE 10 MILLIGRAM(S): 5 TABLET ORAL at 21:20

## 2017-04-03 RX ADMIN — Medication 1000 MILLIGRAM(S): at 11:55

## 2017-04-03 RX ADMIN — OXYCODONE HYDROCHLORIDE 10 MILLIGRAM(S): 5 TABLET ORAL at 01:00

## 2017-04-03 RX ADMIN — Medication 25 MILLIGRAM(S): at 05:03

## 2017-04-03 RX ADMIN — Medication 50 MILLIGRAM(S): at 14:42

## 2017-04-03 RX ADMIN — Medication 5 MILLIGRAM(S): at 04:05

## 2017-04-03 RX ADMIN — OXYCODONE HYDROCHLORIDE 10 MILLIGRAM(S): 5 TABLET ORAL at 14:53

## 2017-04-03 RX ADMIN — AMLODIPINE BESYLATE 10 MILLIGRAM(S): 2.5 TABLET ORAL at 17:23

## 2017-04-03 RX ADMIN — Medication 1000 MILLIGRAM(S): at 11:54

## 2017-04-03 RX ADMIN — Medication 1000 MILLIGRAM(S): at 06:05

## 2017-04-03 RX ADMIN — Medication 400 MILLIGRAM(S): at 05:04

## 2017-04-03 RX ADMIN — OXYCODONE HYDROCHLORIDE 10 MILLIGRAM(S): 5 TABLET ORAL at 04:06

## 2017-04-03 RX ADMIN — LOSARTAN POTASSIUM 100 MILLIGRAM(S): 100 TABLET, FILM COATED ORAL at 05:03

## 2017-04-03 RX ADMIN — OXYCODONE HYDROCHLORIDE 10 MILLIGRAM(S): 5 TABLET ORAL at 10:45

## 2017-04-03 RX ADMIN — Medication 400 MILLIGRAM(S): at 17:23

## 2017-04-03 RX ADMIN — OXYCODONE HYDROCHLORIDE 10 MILLIGRAM(S): 5 TABLET ORAL at 00:08

## 2017-04-03 RX ADMIN — OXYCODONE HYDROCHLORIDE 10 MILLIGRAM(S): 5 TABLET ORAL at 15:42

## 2017-04-03 RX ADMIN — Medication 100 MILLIGRAM(S): at 05:03

## 2017-04-03 RX ADMIN — Medication 25 MILLIGRAM(S): at 00:08

## 2017-04-03 RX ADMIN — ENOXAPARIN SODIUM 40 MILLIGRAM(S): 100 INJECTION SUBCUTANEOUS at 09:12

## 2017-04-03 RX ADMIN — Medication 0.1 MILLIGRAM(S): at 17:23

## 2017-04-03 RX ADMIN — Medication 0.1 MILLIGRAM(S): at 05:03

## 2017-04-03 RX ADMIN — Medication 1000 MILLIGRAM(S): at 05:03

## 2017-04-03 RX ADMIN — OXYCODONE HYDROCHLORIDE 10 MILLIGRAM(S): 5 TABLET ORAL at 05:00

## 2017-04-03 NOTE — DISCHARGE NOTE ADULT - CARE PROVIDER_API CALL
Rajan Ji), Orthopaedic Surgery  [address] [place] [address]  Phone: [phone]  Fax: [phone]

## 2017-04-03 NOTE — DISCHARGE NOTE ADULT - PLAN OF CARE
Improve function and quality of life with reduced foot pain Keep left foot splint and bandage intact and dry at all times.  Elevate "toes above nose" to decrease swelling of left foot.  NO Weight on left foot===use walker or wheelchair for assistance. For Constipation :   • Increase your daily water intake.   • Try adding fiber to your diet by eating fruits, vegetables and foods that are rich in grains.  • If you do experience constipation, you may take an over-the-counter stool softener/laxative such as Colace, Senokot , Milk of Magnesia or Miralax.  - Call your doctor if you experience:  • An increase in pain not controlled by pain medication or change in activity or  position.  • Temperature greater than 101° F.  • Redness, increased swelling or foul smelling drainage from or around the surgical  incision.  • Numbness, tingling or a change in color or temperature of the operative leg.  • Call your doctor immediately if you experience chest pain, shortness of breath or calf pain.

## 2017-04-03 NOTE — DISCHARGE NOTE ADULT - CARE PLAN
Principal Discharge DX:	Left foot pain  Goal:	Improve function and quality of life with reduced foot pain  Instructions for follow-up, activity and diet:	Keep left foot splint and bandage intact and dry at all times.  Elevate "toes above nose" to decrease swelling of left foot.  NO Weight on left foot===use walker or wheelchair for assistance.  Instructions for follow-up, activity and diet:	For Constipation :   • Increase your daily water intake.   • Try adding fiber to your diet by eating fruits, vegetables and foods that are rich in grains.  • If you do experience constipation, you may take an over-the-counter stool softener/laxative such as Colace, Senokot , Milk of Magnesia or Miralax.  - Call your doctor if you experience:  • An increase in pain not controlled by pain medication or change in activity or  position.  • Temperature greater than 101° F.  • Redness, increased swelling or foul smelling drainage from or around the surgical  incision.  • Numbness, tingling or a change in color or temperature of the operative leg.  • Call your doctor immediately if you experience chest pain, shortness of breath or calf pain.

## 2017-04-03 NOTE — DISCHARGE NOTE ADULT - MEDICATION SUMMARY - MEDICATIONS TO TAKE
I will START or STAY ON the medications listed below when I get home from the hospital:    oxyCODONE 5 mg oral tablet  -- 1-2  tab(s) by mouth every 6 hours, As needed, Moderate Pain MDD:8  -- Indication: For pain    cloNIDine 0.1 mg oral tablet  -- 1 tab(s) by mouth 2 times a day  -- Indication: For Hypertension    acebutolol 400 mg oral capsule  -- 1 cap(s) by mouth 2 times a day  -- Indication: For Heart    enoxaparin 40 mg/0.4 mL injectable solution  -- 40 milligram(s) injectable once a day  -- It is very important that you take or use this exactly as directed.  Do not skip doses or discontinue unless directed by your doctor.    -- Indication: For prevention of blood clots    Hyzaar 100 mg-25 mg oral tablet  -- 1 tab(s) by mouth once a day  -- Indication: For High blood pressure    amLODIPine 10 mg oral tablet  -- 1 tab(s) by mouth once a day  -- Indication: For High blood pressure    docusate sodium 100 mg oral capsule  -- 1 cap(s) by mouth 2 times a day, As Needed - for constipation  -- Indication: For Constipation    potassium chloride 20 mEq oral tablet, extended release  -- 1 tab(s) by mouth once a day  -- Indication: For Supplement

## 2017-04-03 NOTE — DISCHARGE NOTE ADULT - HOSPITAL COURSE
This patient was admitted to [facility] for left midfoot fusion/ORIF TMT.  Patient went to Pre-Surgical Testing at [facility] and was medically cleared to undergo elective procedure.  No operative or blanche-operative complications arose during patients hospital course.  Patient received antibiotic according to SCIP guidelines for infection prevention. Lovenox was given for DVT prophylaxis.  Anesthesia, Medical Hospitalist, Physical Therapy and Occupational Therapy were consulted. Patient is stable for discharge with a good prognosis.  Appropriate discharge instructions and medications are provided in this document.

## 2017-04-03 NOTE — PROGRESS NOTE ADULT - SUBJECTIVE AND OBJECTIVE BOX
Pt seen and examined. Doing well. No BM.  PE: LLE  Dsg c/d/i. +Flex and ext toes; SPLT toes; toes WWP  A/P: s/p L Midfoot fusion  NWB LLE in splint  Ice/elelvation  Lovenox   Hold NSAIDs for fusion  Pain control  f/up med recs  -Bowel Reg  D/c pending PT/OT eval  f/up 2 weeks. leave dressing/splint in place until f/up appt

## 2017-04-03 NOTE — DISCHARGE NOTE ADULT - NS AS ACTIVITY OBS
Stairs allowed/Do not drive or operate machinery/Showering allowed/Walking-Outdoors allowed/Walking-Indoors allowed/NO WEIGHT on Left foot!!  Cover left foot when showering!

## 2017-04-03 NOTE — PROGRESS NOTE ADULT - SUBJECTIVE AND OBJECTIVE BOX
Pain well controlled.  Offers no other complaints      Constitutional: No fever, fatigue or weight loss.  Skin: No rash.  Eyes: No recent vision problems or eye pain.  ENT: No congestion, ear pain, or sore throat.  Endocrine: No thyroid problems.  Cardiovascular: No chest pain or palpation.  Respiratory: No cough, shortness of breath, congestion, or wheezing.  Gastrointestinal: No abdominal pain, nausea, vomiting, or diarrhea.  Genitourinary: No dysuria.  Musculoskeletal: No joint swelling.  Neurologic: No headache.          Vital Signs Last 24 Hrs  T(C): 36.5, Max: 37.5 (04-02 @ 15:00)  T(F): 97.7, Max: 99.5 (04-02 @ 15:00)  HR: 72 (68 - 88)  BP: 146/77 (135/80 - 194/84)  BP(mean): --  RR: 16 (16 - 16)  SpO2: 95% (92% - 96%)        PHYSICAL EXAM-  GENERAL: NAD, well-groomed, well-developed  HEAD:  Atraumatic, Normocephalic  EYES: EOMI, PERRLA, conjunctiva and sclera clear  NECK: Supple, No JVD, Normal thyroid  NERVOUS SYSTEM:  Alert & Oriented X3, Motor Strength 5/5 B/L upper and lower extremities; DTRs 2+ intact and symmetric  CHEST/LUNG: Clear to percussion bilaterally; No rales, rhonchi, wheezing, or rubs  HEART: Regular rate and rhythm; No murmurs, rubs, or gallops  ABDOMEN: Soft, Nontender, Nondistended; Bowel sounds present  EXTREMITIES:  2+ Peripheral Pulses, No clubbing, cyanosis, or edema  SKIN: No rashes or lesions            MEDICATIONS  (STANDING):  enoxaparin Injectable 40milliGRAM(s) SubCutaneous daily  docusate sodium 100milliGRAM(s) Oral two times a day  cloNIDine 0.1milliGRAM(s) Oral two times a day  amLODIPine   Tablet 10milliGRAM(s) Oral daily  potassium chloride    Tablet ER 20milliEquivalent(s) Oral daily  acetaminophen   Tablet. 1000milliGRAM(s) Oral every 8 hours  losartan 100milliGRAM(s) Oral daily  acebutolol 400milliGRAM(s) Oral two times a day  hydrALAZINE 25milliGRAM(s) Oral every 6 hours  potassium chloride   Solution 20milliEquivalent(s) Oral once    MEDICATIONS  (PRN):  aluminum hydroxide/magnesium hydroxide/simethicone Suspension 30milliLiter(s) Oral four times a day PRN Indigestion  magnesium hydroxide Suspension 30milliLiter(s) Oral daily PRN Constipation  bisacodyl Suppository 10milliGRAM(s) Rectal daily PRN If no bowel movement  polyethylene glycol 3350 17Gram(s) Oral once PRN IF No BM by D2  diphenhydrAMINE   Capsule 25milliGRAM(s) Oral every 8 hours PRN Rash and/or Itching  oxyCODONE IR 10milliGRAM(s) Oral every 6 hours PRN Moderate Pain  oxyCODONE IR 5milliGRAM(s) Oral every 6 hours PRN Mild Pain (1 - 3)                            13.9   8.8   )-----------( 210      ( 03 Apr 2017 06:29 )             43.2     03 Apr 2017 06:29    143    |  107    |  16     ----------------------------<  105    3.2     |  29     |  0.97     Ca    8.9        03 Apr 2017 06:29  Mg     2.1       02 Apr 2017 06:49

## 2017-04-04 VITALS
TEMPERATURE: 98 F | DIASTOLIC BLOOD PRESSURE: 75 MMHG | OXYGEN SATURATION: 97 % | HEART RATE: 79 BPM | RESPIRATION RATE: 18 BRPM | SYSTOLIC BLOOD PRESSURE: 149 MMHG

## 2017-04-04 LAB
ANION GAP SERPL CALC-SCNC: 8 MMOL/L — SIGNIFICANT CHANGE UP (ref 5–17)
BUN SERPL-MCNC: 17 MG/DL — SIGNIFICANT CHANGE UP (ref 7–23)
CALCIUM SERPL-MCNC: 8.9 MG/DL — SIGNIFICANT CHANGE UP (ref 8.4–10.5)
CHLORIDE SERPL-SCNC: 109 MMOL/L — HIGH (ref 96–108)
CO2 SERPL-SCNC: 28 MMOL/L — SIGNIFICANT CHANGE UP (ref 22–31)
CREAT SERPL-MCNC: 0.97 MG/DL — SIGNIFICANT CHANGE UP (ref 0.5–1.3)
GLUCOSE SERPL-MCNC: 105 MG/DL — HIGH (ref 70–99)
HCT VFR BLD CALC: 41.1 % — SIGNIFICANT CHANGE UP (ref 39–50)
HGB BLD-MCNC: 13.9 G/DL — SIGNIFICANT CHANGE UP (ref 13–17)
MAGNESIUM SERPL-MCNC: 2.3 MG/DL — SIGNIFICANT CHANGE UP (ref 1.6–2.6)
MCHC RBC-ENTMCNC: 29.4 PG — SIGNIFICANT CHANGE UP (ref 27–34)
MCHC RBC-ENTMCNC: 33.8 GM/DL — SIGNIFICANT CHANGE UP (ref 32–36)
MCV RBC AUTO: 87 FL — SIGNIFICANT CHANGE UP (ref 80–100)
PLATELET # BLD AUTO: 238 K/UL — SIGNIFICANT CHANGE UP (ref 150–400)
POTASSIUM SERPL-MCNC: 3.5 MMOL/L — SIGNIFICANT CHANGE UP (ref 3.5–5.3)
POTASSIUM SERPL-SCNC: 3.5 MMOL/L — SIGNIFICANT CHANGE UP (ref 3.5–5.3)
RBC # BLD: 4.72 M/UL — SIGNIFICANT CHANGE UP (ref 4.2–5.8)
RBC # FLD: 13.5 % — SIGNIFICANT CHANGE UP (ref 10.3–14.5)
SODIUM SERPL-SCNC: 145 MMOL/L — SIGNIFICANT CHANGE UP (ref 135–145)
WBC # BLD: 10.3 K/UL — SIGNIFICANT CHANGE UP (ref 3.8–10.5)
WBC # FLD AUTO: 10.3 K/UL — SIGNIFICANT CHANGE UP (ref 3.8–10.5)

## 2017-04-04 PROCEDURE — C1713: CPT

## 2017-04-04 PROCEDURE — 97165 OT EVAL LOW COMPLEX 30 MIN: CPT

## 2017-04-04 PROCEDURE — C1889: CPT

## 2017-04-04 PROCEDURE — 97161 PT EVAL LOW COMPLEX 20 MIN: CPT

## 2017-04-04 PROCEDURE — 85027 COMPLETE CBC AUTOMATED: CPT

## 2017-04-04 PROCEDURE — 97110 THERAPEUTIC EXERCISES: CPT

## 2017-04-04 PROCEDURE — 84132 ASSAY OF SERUM POTASSIUM: CPT

## 2017-04-04 PROCEDURE — 97116 GAIT TRAINING THERAPY: CPT

## 2017-04-04 PROCEDURE — 80048 BASIC METABOLIC PNL TOTAL CA: CPT

## 2017-04-04 PROCEDURE — 76000 FLUOROSCOPY <1 HR PHYS/QHP: CPT

## 2017-04-04 PROCEDURE — 97535 SELF CARE MNGMENT TRAINING: CPT

## 2017-04-04 PROCEDURE — 94664 DEMO&/EVAL PT USE INHALER: CPT

## 2017-04-04 PROCEDURE — 83735 ASSAY OF MAGNESIUM: CPT

## 2017-04-04 PROCEDURE — 97530 THERAPEUTIC ACTIVITIES: CPT

## 2017-04-04 PROCEDURE — 99232 SBSQ HOSP IP/OBS MODERATE 35: CPT

## 2017-04-04 RX ORDER — ACEBUTOLOL HCL 200 MG
0 CAPSULE ORAL
Qty: 0 | Refills: 0 | COMMUNITY

## 2017-04-04 RX ORDER — OXYCODONE HYDROCHLORIDE 5 MG/1
1 TABLET ORAL
Qty: 80 | Refills: 0 | OUTPATIENT
Start: 2017-04-04

## 2017-04-04 RX ORDER — ENOXAPARIN SODIUM 100 MG/ML
40 INJECTION SUBCUTANEOUS
Qty: 28 | Refills: 0 | OUTPATIENT
Start: 2017-04-04 | End: 2017-05-02

## 2017-04-04 RX ORDER — ACEBUTOLOL HCL 200 MG
1 CAPSULE ORAL
Qty: 0 | Refills: 0 | COMMUNITY
Start: 2017-04-04

## 2017-04-04 RX ORDER — POTASSIUM CHLORIDE 20 MEQ
1 PACKET (EA) ORAL
Qty: 0 | Refills: 0 | COMMUNITY
Start: 2017-04-04

## 2017-04-04 RX ORDER — AMLODIPINE BESYLATE 2.5 MG/1
1 TABLET ORAL
Qty: 0 | Refills: 0 | COMMUNITY

## 2017-04-04 RX ORDER — AMLODIPINE BESYLATE 2.5 MG/1
1 TABLET ORAL
Qty: 0 | Refills: 0 | COMMUNITY
Start: 2017-04-04

## 2017-04-04 RX ORDER — ACETAMINOPHEN 500 MG
2 TABLET ORAL
Qty: 0 | Refills: 0 | COMMUNITY
Start: 2017-04-04

## 2017-04-04 RX ORDER — DOCUSATE SODIUM 100 MG
1 CAPSULE ORAL
Qty: 0 | Refills: 0 | COMMUNITY
Start: 2017-04-04

## 2017-04-04 RX ADMIN — Medication 1000 MILLIGRAM(S): at 14:27

## 2017-04-04 RX ADMIN — Medication 400 MILLIGRAM(S): at 06:40

## 2017-04-04 RX ADMIN — Medication 50 MILLIGRAM(S): at 06:38

## 2017-04-04 RX ADMIN — LOSARTAN POTASSIUM 100 MILLIGRAM(S): 100 TABLET, FILM COATED ORAL at 06:38

## 2017-04-04 RX ADMIN — Medication 100 MILLIGRAM(S): at 06:39

## 2017-04-04 RX ADMIN — Medication 1000 MILLIGRAM(S): at 06:38

## 2017-04-04 RX ADMIN — OXYCODONE HYDROCHLORIDE 10 MILLIGRAM(S): 5 TABLET ORAL at 09:21

## 2017-04-04 RX ADMIN — OXYCODONE HYDROCHLORIDE 10 MILLIGRAM(S): 5 TABLET ORAL at 03:27

## 2017-04-04 RX ADMIN — Medication 20 MILLIEQUIVALENT(S): at 14:26

## 2017-04-04 RX ADMIN — Medication 50 MILLIGRAM(S): at 14:27

## 2017-04-04 RX ADMIN — ENOXAPARIN SODIUM 40 MILLIGRAM(S): 100 INJECTION SUBCUTANEOUS at 09:20

## 2017-04-04 RX ADMIN — Medication 0.1 MILLIGRAM(S): at 06:38

## 2017-04-04 RX ADMIN — OXYCODONE HYDROCHLORIDE 10 MILLIGRAM(S): 5 TABLET ORAL at 04:17

## 2017-04-04 RX ADMIN — Medication 1000 MILLIGRAM(S): at 06:39

## 2017-04-04 RX ADMIN — OXYCODONE HYDROCHLORIDE 10 MILLIGRAM(S): 5 TABLET ORAL at 10:20

## 2017-04-04 RX ADMIN — OXYCODONE HYDROCHLORIDE 10 MILLIGRAM(S): 5 TABLET ORAL at 15:38

## 2017-04-04 NOTE — PROGRESS NOTE ADULT - PROBLEM SELECTOR PLAN 2
BP seems to be elevated.  Continue with acebutolol, Norvasc, losartan, clonidine, and will increase the hydralazine dose.  Monitor BP
BP seems to be elevated.  Continue with acebutolol, Norvasc, losartan, clonidine, and will increase the hydralazine dose.  Monitor BP
BP seems to be stable.  Continue with acebutolol, Norvasc, losartan, clonidine, and hydralazine.  Monitor BP
BP seems to be elevated.  Continue with acebutolol, Norvasc, losartan, clonidine, and will add hydralazine.  Monitor BP

## 2017-04-04 NOTE — PROGRESS NOTE ADULT - PROBLEM SELECTOR PROBLEM 3
CKD (chronic kidney disease)

## 2017-04-04 NOTE — PROGRESS NOTE ADULT - SUBJECTIVE AND OBJECTIVE BOX
Feels ok.  Pain well controlled.  Offers no other complaints      Constitutional: No fever, fatigue or weight loss.  Skin: No rash.  Eyes: No recent vision problems or eye pain.  ENT: No congestion, ear pain, or sore throat.  Endocrine: No thyroid problems.  Cardiovascular: No chest pain or palpation.  Respiratory: No cough, shortness of breath, congestion, or wheezing.  Gastrointestinal: No abdominal pain, nausea, vomiting, or diarrhea.  Genitourinary: No dysuria.  Musculoskeletal: No joint swelling.  Neurologic: No headache.      T98.4 P70  /77  RR 18  Spo2 95 RA      PHYSICAL EXAM-  GENERAL: NAD, well-groomed, well-developed  HEAD:  Atraumatic, Normocephalic  EYES: EOMI, PERRLA, conjunctiva and sclera clear  NECK: Supple, No JVD, Normal thyroid  NERVOUS SYSTEM:  Alert & Oriented X3, Motor Strength 5/5 B/L upper and lower extremities  CHEST/LUNG: Clear to percussion bilaterally; No rales, rhonchi, wheezing, or rubs  HEART: Regular rate and rhythm; No murmurs, rubs, or gallops  ABDOMEN: Soft, Nontender, Nondistended; Bowel sounds present  EXTREMITIES:  2+ Peripheral Pulses, No clubbing, cyanosis, or edema  SKIN: No rashes or lesions                            13.9   10.3  )-----------( 238      ( 04 Apr 2017 07:06 )             41.1     04 Apr 2017 07:06    145    |  109    |  17     ----------------------------<  105    3.5     |  28     |  0.97     Ca    8.9        04 Apr 2017 07:06  Mg     2.3       04 Apr 2017 07:06      MEDICATIONS  (STANDING):  enoxaparin Injectable 40milliGRAM(s) SubCutaneous daily  docusate sodium 100milliGRAM(s) Oral two times a day  cloNIDine 0.1milliGRAM(s) Oral two times a day  amLODIPine   Tablet 10milliGRAM(s) Oral daily  potassium chloride    Tablet ER 20milliEquivalent(s) Oral daily  acetaminophen   Tablet. 1000milliGRAM(s) Oral every 8 hours  losartan 100milliGRAM(s) Oral daily  acebutolol 400milliGRAM(s) Oral two times a day  hydrALAZINE 50milliGRAM(s) Oral every 8 hours    MEDICATIONS  (PRN):  aluminum hydroxide/magnesium hydroxide/simethicone Suspension 30milliLiter(s) Oral four times a day PRN Indigestion  magnesium hydroxide Suspension 30milliLiter(s) Oral daily PRN Constipation  bisacodyl Suppository 10milliGRAM(s) Rectal daily PRN If no bowel movement  polyethylene glycol 3350 17Gram(s) Oral once PRN IF No BM by D2  diphenhydrAMINE   Capsule 25milliGRAM(s) Oral every 8 hours PRN Rash and/or Itching  oxyCODONE IR 10milliGRAM(s) Oral every 6 hours PRN Moderate Pain  oxyCODONE IR 5milliGRAM(s) Oral every 6 hours PRN Mild Pain (1 - 3)

## 2017-04-04 NOTE — PROGRESS NOTE ADULT - SUBJECTIVE AND OBJECTIVE BOX
Post Op Day # 4    SUBJECTIVE    61y y.o. Male status post left midfoot fusion .   Patient is alert and comfortable.    Pain is controlled with current pain regimen.  Denies nausea, vomiting, chest pain, shortness of breath, abdominal pain or fever.   No new complaints.    OBJECTIVE    Vital Signs Last 24 Hrs  T(C): 98.1, Max: 98.1 (04-04 @ 08:00)  T(F): 208.6, Max: 208.6 (04-04 @ 08:00)  HR: 70 (70 - 76)  BP: 160/77 (158/70 - 165/70)  BP(mean): --  RR: 18 (17 - 18)  SpO2: 95% (92% - 95%)  I&O's Summary    I & Os for current day (as of 04 Apr 2017 08:21)  =============================================  IN: 0 ml / OUT: 250 ml / NET: -250 ml      Left foot dressing and splint is clean, dry and intact.    Sensation to light touch is grossly intact distally.   Toes are warm and pink  Capillary refill is less than 2 seconds. No cyanosis.  Toes mobile                          13.9   10.3  )-----------( 238      ( 04 Apr 2017 07:06 )             41.1     04 Apr 2017 07:06    145    |  109    |  17     ----------------------------<  105    3.5     |  28     |  0.97     Ca    8.9        04 Apr 2017 07:06  Mg     2.3       04 Apr 2017 07:06        ASSESSMENT AND PLAN    - Orthopedically stable  - DVT prophylaxis: Lovenox x4 weeks  - Continue physical therapy and occupational therapy  - Non weight bearing left lower extremity   - Incentive spirometry encouraged  - Pain control as clinically indicated  - No NSAIDS for fusion  - Ice/Evelation  - Disposition:  Home today

## 2017-04-04 NOTE — PROGRESS NOTE ADULT - PROBLEM SELECTOR PLAN 1
S/P Surgery.  Continue with pain management, DVT proph and wound care as per Ortho.  PT/OT

## 2018-07-16 PROBLEM — S99.929A UNSPECIFIED INJURY OF UNSPECIFIED FOOT, INITIAL ENCOUNTER: Chronic | Status: INACTIVE | Noted: 2017-03-22 | Resolved: 2017-03-31

## 2018-07-16 PROBLEM — G47.30 SLEEP APNEA, UNSPECIFIED: Chronic | Status: INACTIVE | Noted: 2017-03-22 | Resolved: 2017-03-31

## 2019-10-25 NOTE — ASU PREOP CHECKLIST - TO WHOM
Pt does not want iv insertion or iv fluids, Dr Sanchez aware, bloods obtained by butterfly Cindi Melo RN

## 2021-04-13 NOTE — ASU PREOP CHECKLIST - AICD PRESENT
Assessment/Plan:  Problem List Items Addressed This Visit        Cardiovascular and Mediastinum    Migraine with aura and without status migrainosus, not intractable      Stable on Topamax  Management per Neurology  Nervous and Auditory    Tonic-clonic epileptic seizures (HCC)      Stable on Topamax  Management per Neurology  Other    Class 1 obesity without serious comorbidity with body mass index (BMI) of 32 0 to 32 9 in adult     Stable  Pending labs  Recommend lifestyle modifications  To consider sleep study PRN? Patient is currently seen a nutritionist and I advised that her regimen may need to be altered if her labs are normal    On Topamax per Neurology for the treatment of epilepsy and migraines  Relevant Orders    TSH, 3rd generation with Free T4 reflex    Vitamin D 25 hydroxy    Adjustment disorder with anxiety       Stable  Patient declines mood medications and counseling at this time  Smart phone herb list and counseling herb list provided today  Other Visit Diagnoses     Annual physical exam    -  Primary    Screening for diabetes mellitus        Relevant Orders    Hemoglobin A1C    Screening for cardiovascular condition        Relevant Orders    CBC and differential    Comprehensive metabolic panel    Lipid panel    LDL cholesterol, direct    Obesity (BMI 30 0-34  9)        Relevant Orders    TSH, 3rd generation with Free T4 reflex    Hemoglobin A1C    Vitamin D 25 hydroxy    BMI 32 0-32 9,adult        Relevant Orders    TSH, 3rd generation with Free T4 reflex    Hemoglobin A1C    Vitamin D 25 hydroxy    Myalgia        Relevant Orders    Vitamin D 25 hydroxy           Return in about 6 weeks (around 5/26/2021) for F/U Adjust D/O, Obesity, Labs        Future Appointments   Date Time Provider Chan Guerrero   4/27/2021 10:00 AM Edi Marroquin MD [facility]   5/25/2021  9:40 AM Patricia Hand DO [facility]   9/15/2021 10:30 AM Chana Prescilla Severe, MD [facility]        Subjective:     ST HALLMAN is a 43 y o  female who presents today as a new patient for her medical conditions  New Patient    Previous PCP:  Dr Vickey Francois at [place] [id number]   Reason for Transfer: Mother is a patient here  Last seen by previous PCP:  Unknown - records not in [address]:  9/5/20  Last Physical:  2019? Medical Records Requested:  No      HPI:  Chief Complaint   Patient presents with    Annual Exam     Mom has ovarian cancer and would like to know what precautions she should     other     trouble losing wt  -went to dietitian, runs 3 miles daily and has gained 10 lbs     -- Above per clinical staff and reviewed  --      HPI      Today:      Controlled Substance Review    [place]MP or [place]  reviewed: No red flags were identified; safe to proceed with prescription       Mother is Mireille Livers - Ovarian Cancer  Mother had genetic testing, but patient states the testing was unrevaling and BRCA negative  Patient is wondering what preventative things she should do to avoid Ovarian Cancer  Obesity - Last saw Weight Management in 2017  Saw dietician - set up a diet plan, sees PRN- advised Dandelion Root and Tumeric  Watching diet  Using Weight Watchers tracker  +Exercise - Running 25 minutes, 6 days per week  Strength training 30 minutes, 5 days per week  She has been exercising and watching her diet since 5/20  She is frustrated that she is not losing weight as she could do so in the past   She sometimes snores in her sleep when laying supine  Denies witnessed apnea  No prior sleep study  [place] 4 on 4/14/21  Epilepsy / Migraine - Management per Neuro Dr Prescilla Severe  Next appt 9/21  On Topamax 50mg 3 pills BID  Last seizure 2014  Previously on Lamictal       Adjustment Disorder / Anxiety - Gyn previously Rx Wellbutrin - D/C due to frequent thoughts about death, but patient denied SI, D/C Zoloft as it caused fatigue    No other mood meds  No counseling previously  Good social supports  No SI/HI/AH/VH  Her mother lives around the corner from her mother, who watches her two children  PHQ-9 Depression Screening    PHQ-9:   Frequency of the following problems over the past two weeks:      Little interest or pleasure in doing things: 0 - not at all  Feeling down, depressed, or hopeless: 0 - not at all  Trouble falling or staying asleep, or sleeping too much: 0 - not at all  Feeling tired or having little energy: 0 - not at all  Poor appetite or overeatin - not at all  Feeling bad about yourself - or that you are a failure or have let yourself or your family down: 0 - not at all  Trouble concentrating on things, such as reading the newspaper or watching television: 0 - not at all  Moving or speaking so slowly that other people could have noticed  Or the opposite - being so fidgety or restless that you have been moving around a lot more than usual: 0 - not at all  Thoughts that you would be better off dead, or of hurting yourself in some way: 0 - not at all  PHQ-2 Score: 0  PHQ-9 Score: 0       CHARLI-7 Flowsheet Screening      Most Recent Value   Over the last two weeks, how often have you been bothered by the following problems? Feeling nervous, anxious, or on edge  1   Not being able to stop or control worrying  1   Worrying too much about different things  0   Trouble relaxing   0   Being so restless that it's hard to sit still  0   Becoming easily annoyed or irritable   1   Feeling afraid as if something awful might happen  1   How difficult have these problems made it for you to do your work, take care of things at home, or get along with other people? Not difficult at all   CHARLI Score   4          MDQ:  0    Reviewed:  Labs?, Neuro 20, Gyn 20      Sees Gyn Dr Ansley [place] yearly  Next appt   Sees Dentist q6 months  Overdue for Optho  Overdue for Tdap            The following portions of the patient's history were reviewed and updated as appropriate: allergies, current medications, past family history, past medical history, past social history, past surgical history and problem list       Review of Systems   Constitutional: Negative for appetite change, chills, diaphoresis, fatigue and fever  Respiratory: Negative for chest tightness and shortness of breath  Cardiovascular: Negative for chest pain  Gastrointestinal: Negative for abdominal pain, blood in stool, diarrhea, nausea and vomiting  Genitourinary: Negative for dysuria  Current Outpatient Medications   Medication Sig Dispense Refill    B Complex Vitamins (B COMPLEX PO) Take 1 tablet by mouth daily      Misc Natural Products (DANDELION ROOT PO) Take 2 tablets by mouth daily      Multiple Vitamin tablet Take 1 tablet by mouth daily       topiramate (TOPAMAX) 50 MG tablet Take 3 tablets (150 mg total) by mouth 2 (two) times a day (Patient taking differently: Take 150 mg by mouth 2 (two) times a day Rx per Neuro) 540 tablet 3    Turmeric (QC TUMERIC COMPLEX PO) Take 3 tablets by mouth daily       No current facility-administered medications for this visit  Objective:  /72   Pulse 79   Temp 98 4 °F (36 9 °C)   Resp 18   Ht 5' 7 52" (1 715 m)   Wt 97 kg (213 lb 12 8 oz)   SpO2 99%   BMI 32 97 kg/m²    Wt Readings from Last 3 Encounters:   04/14/21 97 kg (213 lb 12 8 oz)   09/08/20 96 7 kg (213 lb 3 2 oz)   06/18/20 97 1 kg (214 lb)      BP Readings from Last 3 Encounters:   04/14/21 114/72   09/08/20 120/82   06/18/20 126/84          Physical Exam  Vitals signs and nursing note reviewed  Constitutional:       Appearance: Normal appearance  She is well-developed  She is obese  HENT:      Head: Normocephalic and atraumatic  Right Ear: External ear normal  There is impacted cerumen        Left Ear: Tympanic membrane, ear canal and external ear normal       Nose: Nose normal       Right Sinus: No maxillary sinus tenderness or frontal sinus tenderness  Left Sinus: No maxillary sinus tenderness or frontal sinus tenderness  Mouth/Throat:      Mouth: Mucous membranes are moist       Pharynx: Oropharynx is clear  Uvula midline  Tonsils: No tonsillar exudate  Eyes:      Extraocular Movements: Extraocular movements intact  Conjunctiva/sclera: Conjunctivae normal       Pupils: Pupils are equal, round, and reactive to light  Neck:      Musculoskeletal: Neck supple  Cardiovascular:      Rate and Rhythm: Normal rate and regular rhythm  Pulses: Normal pulses  Heart sounds: Normal heart sounds  Pulmonary:      Effort: Pulmonary effort is normal       Breath sounds: Normal breath sounds  Abdominal:      General: Bowel sounds are normal  There is no distension  Palpations: Abdomen is soft  There is no mass  Tenderness: There is no abdominal tenderness  There is no guarding or rebound  Musculoskeletal:         General: No swelling or tenderness  Right lower leg: No edema  Lymphadenopathy:      Cervical: No cervical adenopathy  Skin:     Findings: No rash  Neurological:      General: No focal deficit present  Mental Status: She is alert and oriented to person, place, and time  Psychiatric:         Attention and Perception: Attention and perception normal          Mood and Affect: Affect is tearful  Speech: Speech normal          Behavior: Behavior normal  Behavior is cooperative  Thought Content: Thought content normal          Cognition and Memory: Cognition and memory normal          Judgment: Judgment normal          Lab Results:      Lab Results   Component Value Date    WBC 9 11 06/17/2016    HGB 11 5 06/17/2016    HCT 33 2 (L) 06/17/2016     06/17/2016     No results found for: URICACID  Invalid input(s): BASENAME Vitamin D    No results found  POCT Labs      BMI Counseling: Body mass index is 32 97 kg/m²   The BMI is above normal  Nutrition recommendations include encouraging healthy choices of fruits and vegetables  Exercise recommendations include exercising 3-5 times per week  No pharmacotherapy was ordered  no

## 2022-01-28 ENCOUNTER — EMERGENCY (EMERGENCY)
Facility: HOSPITAL | Age: 67
LOS: 1 days | Discharge: HOME | End: 2022-01-28
Attending: EMERGENCY MEDICINE
Payer: MEDICARE

## 2022-01-28 VITALS
SYSTOLIC BLOOD PRESSURE: 214 MMHG | WEIGHT: 205.03 LBS | HEART RATE: 67 BPM | HEIGHT: 64 IN | RESPIRATION RATE: 18 BRPM | DIASTOLIC BLOOD PRESSURE: 90 MMHG

## 2022-01-28 VITALS
TEMPERATURE: 98 F | OXYGEN SATURATION: 97 % | SYSTOLIC BLOOD PRESSURE: 201 MMHG | HEART RATE: 63 BPM | DIASTOLIC BLOOD PRESSURE: 96 MMHG | RESPIRATION RATE: 18 BRPM

## 2022-01-28 DIAGNOSIS — Z98.89 OTHER SPECIFIED POSTPROCEDURAL STATES: Chronic | ICD-10-CM

## 2022-01-28 DIAGNOSIS — Z90.89 ACQUIRED ABSENCE OF OTHER ORGANS: Chronic | ICD-10-CM

## 2022-01-28 LAB
ALBUMIN SERPL ELPH-MCNC: 4.2 G/DL — SIGNIFICANT CHANGE UP (ref 3.3–5)
ALP SERPL-CCNC: 60 U/L — SIGNIFICANT CHANGE UP (ref 40–120)
ALT FLD-CCNC: 27 U/L — SIGNIFICANT CHANGE UP (ref 10–45)
ANION GAP SERPL CALC-SCNC: 11 MMOL/L — SIGNIFICANT CHANGE UP (ref 5–17)
AST SERPL-CCNC: 17 U/L — SIGNIFICANT CHANGE UP (ref 10–40)
BASOPHILS # BLD AUTO: 0.08 K/UL — SIGNIFICANT CHANGE UP (ref 0–0.2)
BASOPHILS NFR BLD AUTO: 0.9 % — SIGNIFICANT CHANGE UP (ref 0–2)
BILIRUB SERPL-MCNC: 0.7 MG/DL — SIGNIFICANT CHANGE UP (ref 0.2–1.2)
BUN SERPL-MCNC: 17 MG/DL — SIGNIFICANT CHANGE UP (ref 7–23)
CALCIUM SERPL-MCNC: 9.6 MG/DL — SIGNIFICANT CHANGE UP (ref 8.4–10.5)
CHLORIDE SERPL-SCNC: 106 MMOL/L — SIGNIFICANT CHANGE UP (ref 96–108)
CO2 SERPL-SCNC: 24 MMOL/L — SIGNIFICANT CHANGE UP (ref 22–31)
CREAT SERPL-MCNC: 1.18 MG/DL — SIGNIFICANT CHANGE UP (ref 0.5–1.3)
EOSINOPHIL # BLD AUTO: 0.29 K/UL — SIGNIFICANT CHANGE UP (ref 0–0.5)
EOSINOPHIL NFR BLD AUTO: 3.2 % — SIGNIFICANT CHANGE UP (ref 0–6)
GLUCOSE SERPL-MCNC: 86 MG/DL — SIGNIFICANT CHANGE UP (ref 70–99)
HCT VFR BLD CALC: 45.5 % — SIGNIFICANT CHANGE UP (ref 39–50)
HGB BLD-MCNC: 15.5 G/DL — SIGNIFICANT CHANGE UP (ref 13–17)
IMM GRANULOCYTES NFR BLD AUTO: 0.3 % — SIGNIFICANT CHANGE UP (ref 0–1.5)
LIDOCAIN IGE QN: 34 U/L — SIGNIFICANT CHANGE UP (ref 7–60)
LYMPHOCYTES # BLD AUTO: 2.46 K/UL — SIGNIFICANT CHANGE UP (ref 1–3.3)
LYMPHOCYTES # BLD AUTO: 27.1 % — SIGNIFICANT CHANGE UP (ref 13–44)
MCHC RBC-ENTMCNC: 28.7 PG — SIGNIFICANT CHANGE UP (ref 27–34)
MCHC RBC-ENTMCNC: 34.1 GM/DL — SIGNIFICANT CHANGE UP (ref 32–36)
MCV RBC AUTO: 84.1 FL — SIGNIFICANT CHANGE UP (ref 80–100)
MONOCYTES # BLD AUTO: 0.77 K/UL — SIGNIFICANT CHANGE UP (ref 0–0.9)
MONOCYTES NFR BLD AUTO: 8.5 % — SIGNIFICANT CHANGE UP (ref 2–14)
NEUTROPHILS # BLD AUTO: 5.45 K/UL — SIGNIFICANT CHANGE UP (ref 1.8–7.4)
NEUTROPHILS NFR BLD AUTO: 60 % — SIGNIFICANT CHANGE UP (ref 43–77)
NRBC # BLD: 0 /100 WBCS — SIGNIFICANT CHANGE UP (ref 0–0)
OB PNL STL: POSITIVE
PLATELET # BLD AUTO: 219 K/UL — SIGNIFICANT CHANGE UP (ref 150–400)
POTASSIUM SERPL-MCNC: 3.3 MMOL/L — LOW (ref 3.5–5.3)
POTASSIUM SERPL-SCNC: 3.3 MMOL/L — LOW (ref 3.5–5.3)
PROT SERPL-MCNC: 6.7 G/DL — SIGNIFICANT CHANGE UP (ref 6–8.3)
RBC # BLD: 5.41 M/UL — SIGNIFICANT CHANGE UP (ref 4.2–5.8)
RBC # FLD: 13.6 % — SIGNIFICANT CHANGE UP (ref 10.3–14.5)
SODIUM SERPL-SCNC: 141 MMOL/L — SIGNIFICANT CHANGE UP (ref 135–145)
WBC # BLD: 9.08 K/UL — SIGNIFICANT CHANGE UP (ref 3.8–10.5)
WBC # FLD AUTO: 9.08 K/UL — SIGNIFICANT CHANGE UP (ref 3.8–10.5)

## 2022-01-28 PROCEDURE — 83550 IRON BINDING TEST: CPT

## 2022-01-28 PROCEDURE — 85025 COMPLETE CBC W/AUTO DIFF WBC: CPT

## 2022-01-28 PROCEDURE — 99283 EMERGENCY DEPT VISIT LOW MDM: CPT

## 2022-01-28 PROCEDURE — 83690 ASSAY OF LIPASE: CPT

## 2022-01-28 PROCEDURE — 36415 COLL VENOUS BLD VENIPUNCTURE: CPT

## 2022-01-28 PROCEDURE — 82728 ASSAY OF FERRITIN: CPT

## 2022-01-28 PROCEDURE — 80053 COMPREHEN METABOLIC PANEL: CPT

## 2022-01-28 PROCEDURE — 82272 OCCULT BLD FECES 1-3 TESTS: CPT

## 2022-01-28 PROCEDURE — 83540 ASSAY OF IRON: CPT

## 2022-01-28 PROCEDURE — 99284 EMERGENCY DEPT VISIT MOD MDM: CPT

## 2022-01-28 RX ORDER — POTASSIUM CHLORIDE 20 MEQ
40 PACKET (EA) ORAL ONCE
Refills: 0 | Status: COMPLETED | OUTPATIENT
Start: 2022-01-28 | End: 2022-01-28

## 2022-01-28 RX ADMIN — Medication 40 MILLIEQUIVALENT(S): at 22:17

## 2022-01-28 NOTE — ED PROVIDER NOTE - PROGRESS NOTE DETAILS
Labs unremarkable. No new episodes of bloody stool since this morning. Offered patient observation for serial H/H, but patient wishes to go home. Pt tolerating PO and taking home BP meds now. Strict return precautions discussed. Pt understands to f/u with PCP and GI. - Mayra Tiwari PA-C

## 2022-01-28 NOTE — ED PROVIDER NOTE - NSICDXPASTSURGICALHX_GEN_ALL_CORE_FT
PAST SURGICAL HISTORY:  H/O knee surgery left, 2013    H/O oral surgery     History of tonsillectomy childhood

## 2022-01-28 NOTE — ED PROVIDER NOTE - PATIENT PORTAL LINK FT
You can access the FollowMyHealth Patient Portal offered by [facility] by registering at the following website: [email or web address]. By joining [employer]’s FollowMyHealth portal, you will also be able to view your health information using other applications (apps) compatible with our system.

## 2022-01-28 NOTE — ED PROVIDER NOTE - OBJECTIVE STATEMENT
67yo M with PMH HTN, presenting with episode of bloody stool today. Reports multiple episodes of diarrhea after eating Italian food last night. Did not check his stools and unsure if bloody. Today had one episode of diarrhea and noted bright red blood in toilet. Never had bloody stool before. Went to GI today and was referred for outpt labs including iron studies and has colonoscopy booked on 2/7/22. Pt reports he then called PCP who advised that he go to ER. Of note, reports always hypertensive in hospital, reports BP WNL at GI office earlier, took all home meds today. Denies any associated abdominal pain or rectal pain, fever/chills, blood thinner use, hematuria, CP, palpitations, dizziness, SOB. 67yo M with PMH HTN, hemorrhoids, presenting with episode of bloody stool today. Reports multiple episodes of diarrhea after eating Italian food last night. Did not check his stools and unsure if bloody. Today had one episode of diarrhea and noted bright red blood in toilet. Never had bloody stool before. Went to GI today and was referred for outpt labs including iron studies and has colonoscopy booked on 2/7/22. Pt reports he then called PCP who advised that he go to ER. Of note, reports always hypertensive in hospital, reports BP WNL at GI office earlier, took all home meds today. Denies any associated abdominal pain or rectal pain, fever/chills, blood thinner use, hematuria, CP, palpitations, dizziness, SOB. 65yo M with PMH HTN, hemorrhoids, presenting with episode of bloody stool today at 9AM. Reports multiple episodes of diarrhea after eating Italian food last night. Did not check his stools and unsure if bloody. Today had one episode of diarrhea and noted bright red blood in toilet. Never had bloody stool before. Went to GI today and was referred for outpt labs including iron studies and has colonoscopy booked on 2/7/22. Pt reports he then called PCP who advised that he go to ER. Of note, reports always hypertensive in hospital, reports BP WNL at GI office earlier, took all home meds today. Denies any associated abdominal pain or rectal pain, fever/chills, blood thinner use, hematuria, CP, palpitations, dizziness, SOB.

## 2022-01-28 NOTE — ED PROVIDER NOTE - NSFOLLOWUPINSTRUCTIONS_ED_ALL_ED_FT
Stay well hydrated. Eat a bland diet.   Advance diet as tolerated and eat small frequent meals.     Follow up with your primary care provider and Gastroenterologist upon discharge.   Be sure to follow up for your colonoscopy as scheduled.   Bring copy of your printed results with you.     Return to ER for any new episodes of bloody stool, new/worsening abdominal pain, vomiting, fever/chills, inability to tolerate food/fluid, chest pain, shortness of breath, dizziness, or any other concerning symptoms.

## 2022-01-28 NOTE — ED ADULT NURSE NOTE - OBJECTIVE STATEMENT
66y male, AAOx3, PMH HTN, 66y male, AAOx3, PMH HTN,  hemorrhoids, presenting with episode of bloody stool today. Reports multiple episodes of diarrhea after eating Italian food last night. Did not check his stools and unsure if bloody. Today had one episode of diarrhea and noted bright red blood in toilet, sent by outpt MD, denies HA, chest pain, shortness of breath, abd pain, n/v, urinary symptoms, 20g placed right ac, labs drawn, comfort and safety provided.

## 2022-01-28 NOTE — ED PROVIDER NOTE - NSICDXFAMILYHX_GEN_ALL_CORE_FT
FAMILY HISTORY:  Father  Still living? No  Family history of stroke, Age at diagnosis: Age Unknown    Mother  Still living? No  Family history of heart attack, Age at diagnosis: Age Unknown    Aunt  Still living? No  Family history of lung cancer, Age at diagnosis: Age Unknown

## 2022-01-28 NOTE — ED PROVIDER NOTE - ATTENDING CONTRIBUTION TO CARE
65yo M with PMH HTN, hemorrhoids, presenting with episode of bloody stool today. Reports multiple episodes of diarrhea after eating Italian food last night today noted to have blood today x1, sent in by his doctor. pt with no sts at this time, abd soft, nt, vss, not on acc, has had 3-4 coloscopy which were nl in the past. check labs, guaiac

## 2022-01-29 PROBLEM — E66.9 OBESITY, UNSPECIFIED: Chronic | Status: ACTIVE | Noted: 2017-03-22

## 2022-01-29 LAB
FERRITIN SERPL-MCNC: 203 NG/ML — SIGNIFICANT CHANGE UP (ref 30–400)
IRON SATN MFR SERPL: 25 % — SIGNIFICANT CHANGE UP (ref 16–55)
IRON SATN MFR SERPL: 80 UG/DL — SIGNIFICANT CHANGE UP (ref 45–165)
TIBC SERPL-MCNC: 321 UG/DL — SIGNIFICANT CHANGE UP (ref 220–430)
UIBC SERPL-MCNC: 240 UG/DL — SIGNIFICANT CHANGE UP (ref 110–370)

## 2022-05-31 PROBLEM — Z00.00 ENCOUNTER FOR PREVENTIVE HEALTH EXAMINATION: Status: ACTIVE | Noted: 2022-05-31

## 2022-07-08 NOTE — ED ADULT NURSE NOTE - CAS TRG GENERAL AIRWAY, MLM
Patent Ear Wedge Repair Text: A wedge excision was completed by carrying down an excision through the full thickness of the ear and cartilage with an inward facing Burow's triangle. The wound was then closed in a layered fashion.

## 2022-08-11 NOTE — DISCHARGE NOTE ADULT - DISCHARGE DATE
I FAXED THE CLINICALS TO [facility] FOR [address] MRI LT SHOULDER. THE FILE WAS SO LARGE, I HAD TO SEND TWO FAXES, TOTALING 61 PAGES OF CLINICALS. I SPOKE WITH AN [facility] REP THIS AFTERNOON, SHE CAN SEE THE FAXES WERE RECEIVED BUT SHE CAN'T TELL IF ALL PAGES WERE RECEIVED. WE ALSO RECEIVED A FAX FROM THEM STATING DUE TO FILE SIZE LIMITATIONS, TO CALL [phone] FOR A P2P ORDER/CASE [id number]. I CALLED [employer] TO LET HER KNOW THIS WAS NOT APPROVED YET. SHE SAID SHE HAS BEEN THROUGH THIS BEFORE AND SHE WANTS TO KEEP THE APPT FOR 8-, THAT THEY WILL EVENTUALLY APPROVE IT. I GAVE HER THE PRICE OF THE MRI AND THE COST OF THE MRI READING. I SENT A STAFF MESSAGE TO SHANITA NUNEZ WITH THIS INFO.
04-Apr-2017

## 2022-10-20 ENCOUNTER — APPOINTMENT (OUTPATIENT)
Dept: ORTHOPEDIC SURGERY | Facility: CLINIC | Age: 67
End: 2022-10-20

## 2022-10-20 ENCOUNTER — RESULT REVIEW (OUTPATIENT)
Age: 67
End: 2022-10-20

## 2022-10-20 VITALS — WEIGHT: 200 LBS | BODY MASS INDEX: 33.32 KG/M2 | HEIGHT: 65 IN

## 2022-10-20 DIAGNOSIS — M79.662 PAIN IN LEFT LOWER LEG: ICD-10-CM

## 2022-10-20 DIAGNOSIS — I10 ESSENTIAL (PRIMARY) HYPERTENSION: ICD-10-CM

## 2022-10-20 PROCEDURE — 99203 OFFICE O/P NEW LOW 30 MIN: CPT

## 2022-10-20 NOTE — HISTORY OF PRESENT ILLNESS
[Left Leg] : left leg [Sudden] : sudden [7] : 7 [0] : 0 [Localized] : localized [Sharp] : sharp [Tightness] : tightness [Intermittent] : intermittent [Leisure] : leisure [Nothing helps with pain getting better] : Nothing helps with pain getting better [Walking] : walking [de-identified] : 10-20-22- states left proximal lateral gastroc/ calf pain came on after exercise walk. he has had pain with walking and push off since. denies fever chills erythema or sob [] : no [FreeTextEntry4] : 3 weeks ago  [FreeTextEntry5] : 66 y.o male c/o left side calf pain x 3 weeks after taking a 1/4 mile walk and feels like he felt his muscle pull and tighten in the left calf and the pain has not gone away since.

## 2022-10-20 NOTE — IMAGING
[de-identified] : left lower leg- +ttp posterior proximal lat gastroc, mild discomfort also with dorsiflexion worse with ambulation, nvid\par - erythema - edema- swelling

## 2023-03-02 ENCOUNTER — OFFICE (OUTPATIENT)
Dept: URBAN - METROPOLITAN AREA CLINIC 34 | Facility: CLINIC | Age: 68
Setting detail: OPHTHALMOLOGY
End: 2023-03-02
Payer: MEDICARE

## 2023-03-02 DIAGNOSIS — H35.033: ICD-10-CM

## 2023-03-02 DIAGNOSIS — H00.022: ICD-10-CM

## 2023-03-02 DIAGNOSIS — H01.001: ICD-10-CM

## 2023-03-02 DIAGNOSIS — H35.40: ICD-10-CM

## 2023-03-02 DIAGNOSIS — H00.025: ICD-10-CM

## 2023-03-02 DIAGNOSIS — H26.492: ICD-10-CM

## 2023-03-02 DIAGNOSIS — Z96.1: ICD-10-CM

## 2023-03-02 PROCEDURE — 92014 COMPRE OPH EXAM EST PT 1/>: CPT | Performed by: OPHTHALMOLOGY

## 2023-03-02 ASSESSMENT — REFRACTION_MANIFEST
OS_AXIS: 160
OS_AXIS: 000
OS_CYLINDER: +0.25
OS_SPHERE: -0.50
OS_SPHERE: -0.25
OD_AXIS: 070
OD_CYLINDER: +1.00
OS_VA1: 20/20
OD_VA1: 20/20
OD_SPHERE: -0.75
OS_VA1: 20/20
OS_CYLINDER: 0.00

## 2023-03-02 ASSESSMENT — VISUAL ACUITY
OS_BCVA: 20/20
OD_BCVA: 20/20

## 2023-03-02 ASSESSMENT — AXIALLENGTH_DERIVED
OD_AL: 22.9359
OS_AL: 22.5417
OS_AL: 22.6759
OD_AL: 22.7986
OS_AL: 22.6759

## 2023-03-02 ASSESSMENT — KERATOMETRY
OD_K1POWER_DIOPTERS: 45.00
METHOD_AUTO_MANUAL: AUTO
OD_K2POWER_DIOPTERS: 47.00
OS_K1POWER_DIOPTERS: 46.00
OS_AXISANGLE_DEGREES: 089
OD_AXISANGLE_DEGREES: 081
OS_K2POWER_DIOPTERS: 47.25

## 2023-03-02 ASSESSMENT — REFRACTION_CURRENTRX
OD_OVR_VA: 20/
OS_ADD: +2.00
OS_OVR_VA: 20/
OD_ADD: +2.00
OD_CYLINDER: +0.75
OD_VPRISM_DIRECTION: PROGS
OD_SPHERE: -8.00
OS_SPHERE: +2.50
OD_AXIS: 101
OD_OVR_VA: 20/
OD_SPHERE: +2.50
OS_CYLINDER: +0.50
OS_OVR_VA: 20/
OS_VPRISM_DIRECTION: PROGS
OS_AXIS: 103
OS_SPHERE: -6.00

## 2023-03-02 ASSESSMENT — SPHEQUIV_DERIVED
OS_SPHEQUIV: -0.5
OD_SPHEQUIV: -0.25
OD_SPHEQUIV: -0.625
OS_SPHEQUIV: -0.125
OS_SPHEQUIV: -0.5

## 2023-03-02 ASSESSMENT — REFRACTION_AUTOREFRACTION
OS_AXIS: 179
OS_SPHERE: -0.75
OD_SPHERE: -1.00
OD_AXIS: 083
OS_CYLINDER: +0.50
OD_CYLINDER: +0.75

## 2023-03-02 ASSESSMENT — CONFRONTATIONAL VISUAL FIELD TEST (CVF)
OD_FINDINGS: FULL
OS_FINDINGS: FULL

## 2023-03-02 ASSESSMENT — LID EXAM ASSESSMENTS
OD_MEIBOMITIS: RLL 1+
OS_MEIBOMITIS: LLL 1+
OS_COMMENTS: TELANGIECTASIA, HYPEREMIA
OD_COMMENTS: TELANGIECTASIA, HYPEREMIA

## 2023-03-02 ASSESSMENT — TONOMETRY
OD_IOP_MMHG: 17
OS_IOP_MMHG: 16

## 2023-04-26 ENCOUNTER — APPOINTMENT (OUTPATIENT)
Dept: ORTHOPEDIC SURGERY | Facility: CLINIC | Age: 68
End: 2023-04-26
Payer: MEDICARE

## 2023-04-26 VITALS — HEIGHT: 65 IN | BODY MASS INDEX: 34.99 KG/M2 | WEIGHT: 210 LBS

## 2023-04-26 PROCEDURE — 20611 DRAIN/INJ JOINT/BURSA W/US: CPT | Mod: LT

## 2023-04-26 PROCEDURE — 73564 X-RAY EXAM KNEE 4 OR MORE: CPT | Mod: LT

## 2023-04-26 PROCEDURE — J3490M: CUSTOM | Mod: NC

## 2023-04-26 PROCEDURE — 99213 OFFICE O/P EST LOW 20 MIN: CPT | Mod: 25

## 2023-04-26 NOTE — PROCEDURE
[FreeTextEntry3] : - Large joint injection was performed of the LEFT knee. \par - The indication for this procedure was pain and inflammation. Patient has tried OTC's including aspirin, Ibuprofen, Aleve, etc or prescription NSAIDS, and/or exercises at home and/or physical therapy without satisfactory response, patient had decreased mobility in the joint and the risks benefits, and alternatives have been discussed, and verbal consent was obtained. The site was prepped with alcohol, betadine, ethyl chloride sprayed topically and sterile technique used. \par - An injection of Betamethasone 2cc; Marcaine 5cc injected.\par - Patient was advised to call if redness, pain or fever occur, apply ice for 15 minutes out of every hour for the next 12-24 hours as tolerated and patient was advised to rest the joint(s) for 2 days. \par - Ultrasound guidance was indicated for this patient due to OBESITY. All ultrasound images have been permanently captured and stored accordingly in our picture archiving and communication system. Visualization of the needle and placement of injection was performed without complication.\par

## 2023-04-26 NOTE — ASSESSMENT
[FreeTextEntry1] : Atraumatic left knee pain after doing some moving and housework.  Advanced medial arthritis on x-rays today.  Prior meniscectomy which likely contributed.  Encourage weight loss home exercise program.  Corticosteroid injection today.  Discussed viscosupplementation in 2 to 3 weeks if still in pain\par \par -The patient's diagnosis was reviewed in detail. Explained this is a chronic, progressive deteriorating condition that may need treatment from time to time as the flare-ups occur. \par -The natural progression of Osteoarthritis was explained to the patient. We discussed the possible treatment options from conservative to operative. \par -We discussed prescription strength NSAIDs, Glucosamine and Chondroitin sulfate, and Physical Therapy as well different types of injections including viscosupplementation. \par -We also discussed that at some point they may progress to needed a total knee replacement. \par \par

## 2023-04-26 NOTE — HISTORY OF PRESENT ILLNESS
[7] : 7 [4] : 4 [Localized] : localized [Sharp] : sharp [Retired] : Work status: retired [] : Post Surgical Visit: no [FreeTextEntry1] : L Knee [FreeTextEntry3] : 4/24/23 [FreeTextEntry5] : 68 y/o LHD M eval L Knee s/p miss step w/ inversion injury 2 days ago. HX of Meniscus arhroscopy on 11/7/2013 by Dr Rodriguez. No TX since new injury  [de-identified] : None [de-identified] : Dep of

## 2023-04-26 NOTE — IMAGING
[de-identified] : Varus deformity\par Mild effusion, no warmth, no ecchymosis\par Medial joint line tenderness to palpation \par Range of motion 0-110 with associated crepitus\par 5/5 quadriceps and hamstring strength\par Ligamentously stable\par Motor and sensory intact distally\par Mildly antalgic gait\par Negative James\par  [Left] : left knee [advanced tricompartmental OA with medial compartment narrowing and varus alignment] : advanced tricompartmental OA with medial compartment narrowing and varus alignment

## 2023-05-17 ENCOUNTER — APPOINTMENT (OUTPATIENT)
Dept: ORTHOPEDIC SURGERY | Facility: CLINIC | Age: 68
End: 2023-05-17

## 2023-08-03 ENCOUNTER — APPOINTMENT (OUTPATIENT)
Dept: ORTHOPEDIC SURGERY | Facility: CLINIC | Age: 68
End: 2023-08-03
Payer: MEDICARE

## 2023-08-03 VITALS — HEIGHT: 65 IN | WEIGHT: 210 LBS | BODY MASS INDEX: 34.99 KG/M2

## 2023-08-03 PROCEDURE — J3490M: CUSTOM | Mod: NC

## 2023-08-03 PROCEDURE — 99213 OFFICE O/P EST LOW 20 MIN: CPT | Mod: 25

## 2023-08-03 PROCEDURE — 20611 DRAIN/INJ JOINT/BURSA W/US: CPT | Mod: LT

## 2023-08-03 NOTE — IMAGING
[de-identified] : Varus deformity\par  Mild effusion, no warmth, no ecchymosis\par  Medial joint line tenderness to palpation \par  Range of motion 0-110 with associated crepitus\par  5/5 quadriceps and hamstring strength\par  Ligamentously stable\par  Motor and sensory intact distally\par  Mildly antalgic gait\par  Negative James\par

## 2023-08-03 NOTE — HISTORY OF PRESENT ILLNESS
[5] : 5 [1] : 2 [FreeTextEntry1] : LT knee [FreeTextEntry5] : Pt is here for left knee follow up. CSI at last visit gave good relief until a few days ago.  [de-identified] : CSI

## 2023-08-03 NOTE — ASSESSMENT
[FreeTextEntry1] : ACUTE EXACERBATION OF CHRONIC LEFT KNEE ADV MFC OA GOOD RELIEF FROM CSI 3 MONTHS AGO STABLE EXAM & SYMPTOMS REPEAT CSI DISCUSSED AND DONE TODAY, TOLERATED WELL DISCUSSED CODIE, HE IS CURRENTLY CARING FOR HIS WIFE AND WILL SCHEDULE AT HIS CONVENIENCE IF NEEDED

## 2023-08-31 ENCOUNTER — APPOINTMENT (OUTPATIENT)
Dept: ORTHOPEDIC SURGERY | Facility: CLINIC | Age: 68
End: 2023-08-31
Payer: MEDICARE

## 2023-08-31 VITALS — WEIGHT: 210 LBS | HEIGHT: 65 IN | BODY MASS INDEX: 34.99 KG/M2

## 2023-08-31 PROCEDURE — 99213 OFFICE O/P EST LOW 20 MIN: CPT | Mod: 25

## 2023-08-31 PROCEDURE — 20611 DRAIN/INJ JOINT/BURSA W/US: CPT | Mod: LT

## 2023-08-31 NOTE — HISTORY OF PRESENT ILLNESS
[6] : 6 [2] : 2 [FreeTextEntry1] : LT knee [FreeTextEntry5] : Pt is here for left knee follow up. States CSI given at last visit gave less relief than usual. Wants to start gel injections today.  [de-identified] : CSI

## 2023-08-31 NOTE — IMAGING
[de-identified] : Varus deformity Mild effusion, no warmth, no ecchymosis Medial joint line tenderness to palpation  Range of motion 0-100 with significant anterior crepitus 5/5 quadriceps and hamstring strength Ligamentously stable Motor and sensory intact distally Mildly antalgic gait Negative James

## 2023-08-31 NOTE — ASSESSMENT
[FreeTextEntry1] : Minimal relief with corticosteroid injection.  Discussed Euflexxa series.  We will start today.  Continue to work on weight loss.  He is currently caring for his ailing wife therefore knee replacement is not an option at this time.  Could consider Zilretta after the series if still symptomatic.

## 2023-08-31 NOTE — PROCEDURE
[FreeTextEntry3] : Large joint injection was performed of the LEFT knee.  The indication for this procedure was pain, inflammation and x-ray evidence of Osteoarthritis on this or prior visit. The site was prepped with alcohol, betadine, ethyl chloride sprayed topically and sterile technique used.  An injection of EUFLEXXA 2ml, series # 1 was used.   Patient was advised to call if redness, pain or fever occur, apply ice for 15 minutes out of every hour for the next 12-24 hours as tolerated and patient was advised to rest the joint(s) for 2 days. Patient has tried OTC's including aspirin, Ibuprofen, Aleve, etc or prescription NSAIDS, and/or exercises at home and/or physical therapy without satisfactory response, patient had decreased mobility in the joint and the risks benefits, and alternatives have been discussed, and verbal consent was obtained.  Ultrasound guidance was indicated for this patient due to OBESITY. All ultrasound images have been permanently captured and stored accordingly in our picture archiving and communication system. Visualization of the needle and placement of injection was performed without complication.

## 2023-09-07 ENCOUNTER — APPOINTMENT (OUTPATIENT)
Dept: ORTHOPEDIC SURGERY | Facility: CLINIC | Age: 68
End: 2023-09-07
Payer: MEDICARE

## 2023-09-07 VITALS — WEIGHT: 210 LBS | BODY MASS INDEX: 34.99 KG/M2 | HEIGHT: 65 IN

## 2023-09-07 PROCEDURE — 99212 OFFICE O/P EST SF 10 MIN: CPT | Mod: 25

## 2023-09-07 PROCEDURE — 20611 DRAIN/INJ JOINT/BURSA W/US: CPT | Mod: LT

## 2023-09-07 NOTE — IMAGING
[de-identified] : Varus deformity Mild effusion, no warmth, no ecchymosis Medial joint line tenderness to palpation  Range of motion 0-100 with significant anterior crepitus 5/5 quadriceps and hamstring strength Ligamentously stable Motor and sensory intact distally Mildly antalgic gait Negative James

## 2023-09-07 NOTE — ASSESSMENT
[FreeTextEntry1] : Minimal relief with corticosteroid injection. Euflexxa #2 today.  Continue to work on weight loss.  He is currently caring for his ailing wife therefore knee replacement is not an option at this time.  Could consider Zilretta after the series if still symptomatic.  Will follow up with Dr. Ji for L foot pain, hx ORIF Lisfranc fx March 2017.

## 2023-09-07 NOTE — HISTORY OF PRESENT ILLNESS
[6] : 6 [2] : 2 [de-identified] : 9/7/23: here for Euflexxa #2 LEFT knee.  No issues with initial injection. Mid August, having flare-up L foot pain with change in sneakers, pain with direct pressure of surgical site.  Surgery with Dr. Ji March 2017 - fell down basement steps walking backward carrying laundry.  [FreeTextEntry1] : LT knee [FreeTextEntry5] : Pt is here for left knee follow up. States CSI given at last visit gave less relief than usual. Wants to start gel injections today.  [de-identified] : CSI

## 2023-09-12 ENCOUNTER — APPOINTMENT (OUTPATIENT)
Dept: ORTHOPEDIC SURGERY | Facility: CLINIC | Age: 68
End: 2023-09-12
Payer: MEDICARE

## 2023-09-12 VITALS — BODY MASS INDEX: 34.99 KG/M2 | WEIGHT: 210 LBS | HEIGHT: 65 IN

## 2023-09-12 DIAGNOSIS — M25.572 PAIN IN LEFT ANKLE AND JOINTS OF LEFT FOOT: ICD-10-CM

## 2023-09-12 PROCEDURE — 99214 OFFICE O/P EST MOD 30 MIN: CPT

## 2023-09-12 PROCEDURE — 73630 X-RAY EXAM OF FOOT: CPT | Mod: LT

## 2023-09-14 ENCOUNTER — APPOINTMENT (OUTPATIENT)
Dept: ORTHOPEDIC SURGERY | Facility: CLINIC | Age: 68
End: 2023-09-14

## 2023-12-13 ENCOUNTER — APPOINTMENT (OUTPATIENT)
Dept: ORTHOPEDIC SURGERY | Facility: CLINIC | Age: 68
End: 2023-12-13
Payer: MEDICARE

## 2023-12-13 VITALS — BODY MASS INDEX: 34.99 KG/M2 | WEIGHT: 210 LBS | HEIGHT: 65 IN

## 2023-12-13 DIAGNOSIS — E66.9 OBESITY, UNSPECIFIED: ICD-10-CM

## 2023-12-13 PROCEDURE — 20611 DRAIN/INJ JOINT/BURSA W/US: CPT | Mod: LT

## 2023-12-13 PROCEDURE — 99214 OFFICE O/P EST MOD 30 MIN: CPT | Mod: 25

## 2023-12-13 NOTE — PROCEDURE
[FreeTextEntry3] : - Large joint injection was performed of the _LEFT knee.  - The indication for this procedure was pain and inflammation. Patient has tried OTC's including aspirin, Ibuprofen, Aleve, etc or prescription NSAIDS, and/or exercises at home and/or physical therapy without satisfactory response, patient had decreased mobility in the joint and the risks benefits, and alternatives have been discussed, and verbal consent was obtained. The site was prepped with alcohol, betadine, ethyl chloride sprayed topically and sterile technique used.  - An injection of ZILRETTA 5CC; Marcaine 5cc injected. - Patient was advised to call if redness, pain or fever occur, apply ice for 15 minutes out of every hour for the next 12-24 hours as tolerated and patient was advised to rest the joint(s) for 2 days.  - Ultrasound guidance was indicated for this patient due to MORBID OBESITY. All ultrasound images have been permanently captured and stored accordingly in our picture archiving and communication system. Visualization of the needle and placement of injection was performed without complication.

## 2023-12-13 NOTE — HISTORY OF PRESENT ILLNESS
[5] : 5 [Throbbing] : throbbing [] : no [FreeTextEntry1] : LEFT knee  [FreeTextEntry5] : Felt no relief from Euflexxa.

## 2023-12-13 NOTE — IMAGING
[de-identified] : Left knee Varus deformity No effusion, no warmth, no ecchymosis Medial joint line tenderness to palpation  Range of motion 0-100 with significant anterior crepitus 5/5 quadriceps and hamstring strength Ligamentously stable Motor and sensory intact distally Mildly antalgic gait Negative James

## 2024-05-03 ENCOUNTER — EMERGENCY (EMERGENCY)
Facility: HOSPITAL | Age: 69
LOS: 1 days | Discharge: HOME | End: 2024-05-03
Attending: EMERGENCY MEDICINE
Payer: MEDICARE

## 2024-05-03 VITALS
WEIGHT: 210.1 LBS | HEART RATE: 73 BPM | SYSTOLIC BLOOD PRESSURE: 214 MMHG | OXYGEN SATURATION: 95 % | HEIGHT: 65 IN | RESPIRATION RATE: 24 BRPM | TEMPERATURE: 99 F | DIASTOLIC BLOOD PRESSURE: 96 MMHG

## 2024-05-03 VITALS
OXYGEN SATURATION: 99 % | SYSTOLIC BLOOD PRESSURE: 189 MMHG | DIASTOLIC BLOOD PRESSURE: 68 MMHG | RESPIRATION RATE: 17 BRPM | HEART RATE: 64 BPM | TEMPERATURE: 98 F

## 2024-05-03 DIAGNOSIS — Z98.89 OTHER SPECIFIED POSTPROCEDURAL STATES: Chronic | ICD-10-CM

## 2024-05-03 DIAGNOSIS — Z90.89 ACQUIRED ABSENCE OF OTHER ORGANS: Chronic | ICD-10-CM

## 2024-05-03 PROCEDURE — 99282 EMERGENCY DEPT VISIT SF MDM: CPT

## 2024-05-03 PROCEDURE — 99283 EMERGENCY DEPT VISIT LOW MDM: CPT | Mod: GC

## 2024-05-03 RX ORDER — LIDOCAINE 4 G/100G
10 CREAM TOPICAL ONCE
Refills: 0 | Status: COMPLETED | OUTPATIENT
Start: 2024-05-03 | End: 2024-05-03

## 2024-05-03 RX ADMIN — Medication 30 MILLILITER(S): at 19:13

## 2024-05-03 RX ADMIN — LIDOCAINE 10 MILLILITER(S): 4 CREAM TOPICAL at 19:13

## 2024-05-03 NOTE — ED PROVIDER NOTE - PHYSICAL EXAMINATION
GENERAL: well appearing in no acute distress, non-toxic appearing  HEENT:  oral mucosa moist, +mild oropharyngeal post erythema without edema  CARDIAC: regular rate and rhythm, normal S1S2,  PULM: normal breath sounds, clear to ascultation bilaterally, no rales, rhonchi, wheezing  NEURO: normal speech  SKIN: well-perfused, extremities warm, no visible rashes

## 2024-05-03 NOTE — ED PROVIDER NOTE - ATTENDING CONTRIBUTION TO CARE
Hx: choking episode on Norvasc, improved in ED.  No sxs currently.    PE: well appearing, nontoxic, no respiratory distress.  Neuro nonfocal.  Skin intact. Psych normal mood.  No flushing.    MDM: choking episode, throat irritation, maalox, no indication for emergency blood work, no signs for anaphylaxis, allergic reaction, drug reaction.

## 2024-05-03 NOTE — ED PROVIDER NOTE - NSFOLLOWUPINSTRUCTIONS_ED_ALL_ED_FT
You were seen in the ED after your choking episode, you were given medication here and observed and appears your symptoms have improved.    Please follow-up with your doctors as regular scheduled    Please seek immediate medical attention return to ED if you have any new or worsening symptoms including but not limited to: Shortness of breath, itchiness, difficulty swallowing, nausea/vomiting.

## 2024-05-03 NOTE — ED PROVIDER NOTE - CLINICAL SUMMARY MEDICAL DECISION MAKING FREE TEXT BOX
68-year-old male history HTN, obesity, presents after choking episode on his Norvasc tablet.  Patient tried to take his Norvasc tablet without water got stuck in his throat choked was able to cough up the pill however was complaining of throat pain and some ear pressure afterwards and his face became flushed.  Symptoms already improving, denies shortness of breath chest pain, abdominal pain, N/V/D.  All vitals here are stable patient mildly hypertensive, lungs are clear on exam, oropharynx with some mild posterior or erythema without edema or abrasion.  Concern for possible choking episode now resolved, unlikely allergic reaction given patient been on this medication for a long period of time without issue and history more consistent with choking.  Will provide symptomatic treatment and reassess likely to be discharged.

## 2024-05-03 NOTE — ED PROVIDER NOTE - PATIENT PORTAL LINK FT
You can access the FollowMyHealth Patient Portal offered by [facility] by registering at the following website: [email or web address]. By joining [employer]’s FollowMyHealth portal, you will also be able to view your health information using other applications (apps) compatible with our system.

## 2024-05-03 NOTE — ED ADULT NURSE NOTE - CAS DISCH CONDITION
"To discuss usage of cardiocom scale. Date of last weight for pt was 1.25.2019. Pt stated he would like to discuss it with his \"Cardiology Team.\" States he missed his last appt and would be seeing his \"team\" soon. Pt's last appt in [facility] was 9.26.19. Pt has cancled [facility] appts on 1.7.20, 2.4.20, 2.26.20. Asked pt if it would be okay if I discussed with [facility] provider, pt stated, Yes, that would be perfect, thank you!\" Spoke to Mary Carmen Hill NP. She stated scale should be returned if not used.  Called pt, he stated he agreed that he would like to return scale. Sent to [facility] for retrieval. Laura Phan RN [facility] Care Coordinator     "
Stable

## 2024-05-03 NOTE — ED ADULT NURSE NOTE - NSFALLUNIVINTERV_ED_ALL_ED
Bed/Stretcher in lowest position, wheels locked, appropriate side rails in place/Call bell, personal items and telephone in reach/Instruct patient to call for assistance before getting out of bed/chair/stretcher/Non-slip footwear applied when patient is off stretcher/[place] to call system/Physically safe environment - no spills, clutter or unnecessary equipment/Purposeful proactive rounding/Room/bathroom lighting operational, light cord in reach
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
## 2024-05-03 NOTE — ED ADULT NURSE NOTE - OBJECTIVE STATEMENT
67yo M PMH HTN complains of throat pain following norvasc pill getting stuck in throat. 1 hour ago patient attempted to take norvasc pill by mouth without water and felt pill get stuck in back of throat, causing cough and irritation. Patient was able to cough up pill but continued feeling irriation. Patient has been taking medication for several years without reaction. Of note, patient has had two weeks of intermittent shortness of breath, had received CT with contrast this afternoon to r/o possible PE. Patient reports no SOB at this time. 67yo M PMH HTN complains of throat pain following norvasc pill getting stuck in throat. 1 hour ago patient attempted to take norvasc pill by mouth without water and felt pill get stuck in back of throat, causing cough and irritation. Patient was able to cough up pill but continued feeling irriation. Patient has been taking medication for several years without reaction. Of note, patient has had two weeks of intermittent shortness of breath, had received CT with contrast this afternoon to r/o possible PE. Patient reports no SOB at this time. Patient denies chest pain, N/V, SOB, dizziness, rash, itchiness, pain.

## 2024-05-13 ENCOUNTER — APPOINTMENT (OUTPATIENT)
Dept: ORTHOPEDIC SURGERY | Facility: CLINIC | Age: 69
End: 2024-05-13
Payer: MEDICARE

## 2024-05-13 DIAGNOSIS — T85.848A PAIN DUE TO OTHER INTERNAL PROSTHETIC DEVICES, IMPLANTS AND GRAFTS, INITIAL ENCOUNTER: ICD-10-CM

## 2024-05-13 PROCEDURE — 99214 OFFICE O/P EST MOD 30 MIN: CPT | Mod: 25

## 2024-05-13 PROCEDURE — 73630 X-RAY EXAM OF FOOT: CPT | Mod: LT

## 2024-05-13 PROCEDURE — 20610 DRAIN/INJ JOINT/BURSA W/O US: CPT | Mod: LT

## 2024-05-13 NOTE — PROCEDURE
[Large Joint Injection] : Large joint injection [Left] : of the left [Knee] : knee [Inflammation] : inflammation [X-ray evidence of Osteoarthritis on this or prior visit] : x-ray evidence of Osteoarthritis on this or prior visit [Repeat series performed] : repeat series performed [___ cc    1%] : Lidocaine ~Vcc of 1%  [___ cc    10mg] : Triamcinolone (Kenalog) ~Vcc of 10 mg  [] : Patient tolerated procedure well [Call if redness, pain or fever occur] : call if redness, pain or fever occur [Apply ice for 15min out of every hour for the next 12-24 hours as tolerated] : apply ice for 15 minutes out of every hour for the next 12-24 hours as tolerated [Previous OTC use and PT nontherapeutic] : patient has tried OTC's including aspirin, Ibuprofen, Aleve, etc or prescription NSAIDS, and/or exercises at home and/or physical therapy without satisfactory response [Patient had decreased mobility in the joint] : patient had decreased mobility in the joint [Risks, benefits, alternatives discussed / Verbal consent obtained] : the risks benefits, and alternatives have been discussed, and verbal consent was obtained

## 2024-05-13 NOTE — IMAGING
[Left] : left foot [There are no fractures, subluxations or dislocations. No significant abnormalities are seen] : There are no fractures, subluxations or dislocations. No significant abnormalities are seen [No loss of surgical correlation. Bony alignment acceptable. Hardware in appropriate position] : No loss of surgical correlation. Bony alignment acceptable. Hardware in appropriate position

## 2024-05-13 NOTE — REVIEW OF SYSTEMS
----- Message from Erin Mcintosh sent at 10/3/2017  2:10 PM EDT -----  Regarding: PT CALLED - PT REQUESTED SOMETHING FOR NAUSEA  Contact: [phone]  PLEASE CALL   [Joint Pain] : joint pain [Negative] : Heme/Lymph

## 2024-05-13 NOTE — HISTORY OF PRESENT ILLNESS
[8] : 8 [2] : 2 [Sharp] : sharp [Rest] : rest [Walking] : walking [de-identified] : 09/12/2023: h/o ORIF Lisfranc w/ fusion in 2017. was doing well until 1 month ago after a period of a lot of walking/gardening. seeing dr reyes for ongoing knee problem   05/13/2024:  reports ongoing foot pain. since last visit went to PT for foot and knee. also had csi for knee oa w/ complete resolution of foot and knee sx [] : Post Surgical Visit: no [FreeTextEntry1] : Lt foot

## 2024-05-13 NOTE — ASSESSMENT
[FreeTextEntry1] : wbat shoe modification discussed hardware removal in future if needed requesting knee csi today f/up prn

## 2024-05-13 NOTE — PHYSICAL EXAM
[2+] : dorsalis pedis pulse: 2+ [Left] : left knee [5___] : hamstring 5[unfilled]/5 [] : negative anterior drawer at ankle [FreeTextEntry8] : some ttp at palpable hardware

## 2024-06-21 ENCOUNTER — APPOINTMENT (OUTPATIENT)
Dept: ORTHOPEDIC SURGERY | Facility: CLINIC | Age: 69
End: 2024-06-21
Payer: MEDICARE

## 2024-06-21 VITALS — HEIGHT: 65 IN | BODY MASS INDEX: 34.99 KG/M2 | WEIGHT: 210 LBS

## 2024-06-21 DIAGNOSIS — M17.12 UNILATERAL PRIMARY OSTEOARTHRITIS, LEFT KNEE: ICD-10-CM

## 2024-06-21 PROCEDURE — 73564 X-RAY EXAM KNEE 4 OR MORE: CPT | Mod: LT

## 2024-06-21 PROCEDURE — G2211 COMPLEX E/M VISIT ADD ON: CPT

## 2024-06-21 PROCEDURE — 99205 OFFICE O/P NEW HI 60 MIN: CPT

## 2024-06-21 RX ORDER — CELECOXIB 100 MG/1
100 CAPSULE ORAL
Qty: 60 | Refills: 1 | Status: ACTIVE | COMMUNITY
Start: 2024-06-21 | End: 1900-01-01

## 2024-06-21 NOTE — PHYSICAL EXAM
[de-identified] : Patient is well nourished, well-developed, in no acute distress, with appropriate mood and affect. The patient is oriented to time, place, and person. Respirations are even and unlabored. Gait evaluation does reveal a limp. There is no inguinal adenopathy. Examination of the contralateral knee shows normal range of motion, strength, no tenderness, and intact skin. The affected limb is well-perfused, without skin lesions, shows a grossly normal motor and sensory examination. Knee motion is significantly reduced and does cause significant pain. The knee moves from 0 to 95 degrees. The knee is stable within that range-of-motion to AP and ML stress. The alignment of the knee is 5 degrees varus. Muscle strength is normal. Pedal pulses are palpable. Hip examination was negative. [de-identified] : Long standing knee, AP knee, lateral knee, and patellar views of the left knee were ordered and taken in the office and demonstrate degenerative joint disease of the knee with joint space narrowing, osteophyte formation, and subchondral sclerosis.

## 2024-06-21 NOTE — DISCUSSION/SUMMARY
[de-identified] : This patient has left knee osteoarthritis. He elects to defer TKA. An extensive discussion was conducted on the natural history of the disease and the variety of surgical and non-surgical options available to the patient including, but not limited to non-steroidal anti-inflammatory medications, steroid injections, physical therapy, maintenance of ideal body weight, and reduction of activity. PT rx'd. Celebrex rx'd.  The patient will schedule an appointment as needed.

## 2024-06-21 NOTE — HISTORY OF PRESENT ILLNESS
[de-identified] : This is very nice 69-year-old gentleman experiencing left knee pain, which is severe in intensity.  He has known left knee osteoarthritis.  The pain substantially limits activities of daily living. Walking tolerance is reduced. Cortisone shots have helped in the past. PT helps. The patient denies any radiation of the pain to the feet and it is not associated with numbness, tingling, or weakness.

## 2024-06-28 NOTE — OCCUPATIONAL THERAPY INITIAL EVALUATION ADULT - THERAPY FREQUENCY, OT EVAL
[place] - [facility] Surg  Podiatry  Consult Note    Patient Name: Saji Castañeda  MRN: [id number]  Admission Date: 6/26/2024  Hospital Length of Stay: 1 days  Attending Physician: Akira Li MD  Primary Care Provider: [facility] -     Inpatient consult to Podiatry  Consult performed by: Barbara Orozco DPM  Consult ordered by: Lo Melgar NP        Subjective:     History of Present Illness:  Pt is a 75 y/o male  has a past medical history of Arthritis, Bacteremia due to Gram-negative bacteria, Diabetes mellitus, Diabetes mellitus, type 2, Hyperlipidemia, Hypertension, Osteomyelitis, and Palliative care encounter.  Admitted for Anemia and consulted to Podiatry for heel ulcer.     Scheduled Meds:   ferrous sulfate  1 tablet Oral Daily    gabapentin  300 mg Oral BID    isosorbide dinitrate  10 mg Oral TID    pantoprazole  40 mg Intravenous BID    senna-docusate 8.6-50 mg  1 tablet Oral BID    sodium chloride 0.9%  10 mL Intravenous Q8H    tamsulosin  0.4 mg Oral Daily     Continuous Infusions:  PRN Meds:  Current Facility-Administered Medications:     0.9%  NaCl infusion (for blood administration), , Intravenous, Q24H PRN    0.9%  NaCl infusion (for blood administration), , Intravenous, Q24H PRN    acetaminophen, 650 mg, Oral, Q4H PRN    albuterol-ipratropium, 3 mL, Nebulization, Q4H PRN    aluminum-magnesium hydroxide-simethicone, 30 mL, Oral, QID PRN    dextrose 10%, 12.5 g, Intravenous, PRN    dextrose 10%, 25 g, Intravenous, PRN    diphenhydrAMINE, 25 mg, Oral, Q6H PRN    furosemide (LASIX) injection, 20 mg, Intravenous, PRN    glucagon (human recombinant), 1 mg, Intramuscular, PRN    glucose, 16 g, Oral, PRN    glucose, 24 g, Oral, PRN    HYDROcodone-acetaminophen, 1 tablet, Oral, Q6H PRN    insulin aspart U-100, 0-5 Units, Subcutaneous, QID (AC + HS) PRN    melatonin, 6 mg, Oral, Nightly PRN    morphine, 1 mg, Intravenous, Q6H PRN    ondansetron, 8 mg, Oral, Q8H PRN    ondansetron, 4  mg, Intravenous, Q8H PRN    simethicone, 1 tablet, Oral, QID PRN    Review of patient's allergies indicates:  No Known Allergies     Past Medical History:   Diagnosis Date    Arthritis     legs    Bacteremia due to Gram-negative bacteria 3/23/2021    Diabetes mellitus     Diabetes mellitus, type 2     Hyperlipidemia     Hypertension     Osteomyelitis     Palliative care encounter 5/24/2023     Past Surgical History:   Procedure Laterality Date    ABOVE-KNEE AMPUTATION Left 3/27/2024    Procedure: AMPUTATION, ABOVE KNEE;  Surgeon: Adal Arellano MD;  Location: [facility] OR [address] [place] [address];  Service: Vascular;  Laterality: Left;    ANGIOGRAPHY OF LOWER EXTREMITY N/A 2/3/2021    Procedure: Angiogram Extremity Bilateral;  Surgeon: Ernst Chacko MD;  Location: [facility] OR [address] [place] [address];  Service: Peripheral Vascular;  Laterality: N/A;  7.4 mintues fluoroscopy time  816.15 mGy  170.17 Gycm2    AORTOGRAPHY WITH EXTREMITY RUNOFF Bilateral 2/3/2021    Procedure: AORTOGRAM, WITH EXTREMITY RUNOFF;  Surgeon: Ernst Chacko MD;  Location: [address];  Service: Peripheral Vascular;  Laterality: Bilateral;    DEBRIDEMENT OF FOOT Left 2/23/2021    Procedure: DEBRIDEMENT, LEFT HEEL;  Surgeon: Mayra Schroeder DPM;  Location: [facility] OR [address] [place] [address];  Service: Podiatry;  Laterality: Left;    FOOT AMPUTATION  October 2010    left high midfoot amputation    IMPLANTATION OF LEADLESS PACEMAKER N/A 10/12/2023    Procedure: AWIOUWWRI-EAXDKEENB-CKLGYBNO;  Surgeon: VANESSA Delatorre MD;  Location: [facility] EP LAB;  Service: Cardiology;  Laterality: N/A;  AVB, MICRA, EH, [place]ES, [place] [id number]       Family History       Problem Relation (Age of Onset)    Cancer Brother    Diabetes Mother, Sister    Heart disease Mother    Stroke Sister          Tobacco Use    Smoking status: Never    Smokeless tobacco: Never   Substance and Sexual Activity    Alcohol use: No     Comment: occassional    Drug use: No    Sexual activity: Not Currently     Review of  Systems   Constitutional:  Negative for activity change, chills, diaphoresis and fever.   HENT:  Negative for congestion and hearing loss.    Respiratory:  Negative for cough and shortness of breath.    Gastrointestinal:  Negative for nausea and vomiting.   Skin:  Positive for color change and wound. Negative for pallor and rash.   Neurological:  Positive for numbness. Negative for speech difficulty.   Psychiatric/Behavioral:  Negative for agitation and confusion.      Objective:     Vital Signs (Most Recent):  Temp: 99.2 °F (37.3 °C) (06/28/24 0714)  Pulse: 85 (06/28/24 0714)  Resp: 20 (06/28/24 0714)  BP: (!) 147/70 (06/28/24 0714)  SpO2: 99 % (06/28/24 0714) Vital Signs (24h Range):  Temp:  [98.2 °F (36.8 °C)-99.4 °F (37.4 °C)] 99.2 °F (37.3 °C)  Pulse:  [53-91] 85  Resp:  [20] 20  SpO2:  [95 %-100 %] 99 %  BP: (102-147)/(55-72) 147/70     Weight: 87.5 kg (192 lb 14.4 oz)  Body mass index is 28.49 kg/m².    Foot Exam    General  General Appearance: appears stated age and healthy   Orientation: alert and oriented to person, place, and time   Affect: appropriate       Right Foot/Ankle     Inspection and Palpation  Ecchymosis: none  Tenderness: none   Swelling: none   Skin Exam: skin intact;     Neurovascular  Dorsalis pedis: 1+  Posterior tibial: absent  Saphenous nerve sensation: diminished  Tibial nerve sensation: diminished  Superficial peroneal nerve sensation: diminished  Deep peroneal nerve sensation: diminished  Sural nerve sensation: diminished      Left Foot/Ankle      Inspection and Palpation  Ecchymosis: none  Tenderness: none   Swelling: none   Skin Exam: skin intact;     Neurovascular  Dorsalis pedis: 1+  Posterior tibial: absent  Saphenous nerve sensation: diminished  Tibial nerve sensation: diminished  Superficial peroneal nerve sensation: diminished  Deep peroneal nerve sensation: diminished  Sural nerve sensation: diminished          Laboratory:  A1C:   Recent Labs   Lab 03/05/24  0900  "06/26/24  1200   HGBA1C >14.0* 6.8*     Blood Cultures:   Recent Labs   Lab 06/26/24 2344   LABBLOO No Growth to date  No Growth to date     CBC:   Recent Labs   Lab 06/28/24  0523   WBC 8.54   RBC 3.04*   HGB 7.7*   HCT 24.8*   *   MCV 82   MCH 25.3*   MCHC 31.0*     CMP:   Recent Labs   Lab 06/26/24  1200 06/27/24  0527 06/28/24  0523   *   < > 86   CALCIUM 8.8   < > 8.4*   ALBUMIN 1.6*  --   --    PROT 6.7  --   --       < > 141   K 3.0*   < > 3.7   CO2 33*   < > 30*   CL 98   < > 100   BUN 13   < > 9   CREATININE 0.9   < > 0.7   ALKPHOS 79  --   --    ALT 17  --   --    AST 26  --   --    BILITOT 0.2  --   --     < > = values in this interval not displayed.     CRP: No results for input(s): "CRP" in the last 168 hours.  ESR: No results for input(s): "SEDRATE" in the last 168 hours.  Wound Cultures:   Recent Labs   Lab 03/06/24  1439   LABAERO PROTEUS MIRABILIS  Few  *  ENTEROBACTER CLOACAE  Few  Skin bossman also present  *     Microbiology Results (last 7 days)       Procedure Component Value Units Date/Time    Blood culture [[id number]]     Order Status: Sent Specimen: Blood     Blood culture [[id number]]     Order Status: Sent Specimen: Blood     Blood culture [[id number]] Collected: 06/26/24 2344    Order Status: Completed Specimen: Blood Updated: 06/27/24 1915     Blood Culture, Routine No Growth to date    Blood culture [[id number]] Collected: 06/26/24 2344    Order Status: Completed Specimen: Blood Updated: 06/27/24 1915     Blood Culture, Routine No Growth to date          Specimen (24h ago, onward)      None            Diagnostic Results:  I have reviewed all pertinent imaging results/findings within the past 24 hours.    Clinical Findings:          Assessment/Plan:     Cardiac/Vascular  Paroxysmal atrial fibrillation  Management per Hospital Medicine    PVD (peripheral vascular disease)  Management per Hospital Medicine    Peripheral arterial disease  Management per Hospital " Medicine    Essential hypertension  Management per Hospital Medicine    Renal/  Stage 3b chronic kidney disease  Management per Hospital Medicine    Hematology  Chronic anticoagulation  Management per Hospital Medicine    Chronic deep vein thrombosis (DVT) of right upper extremity  Management per Hospital Medicine    Oncology  * Anemia  Management per Hospital Medicine    Endocrine  Type 2 diabetes mellitus, with long-term current use of insulin  Management per Hospital Medicine    Orthopedic  Wounds, multiple  For right heel wound, xray, CT ordered  Plan for possible bone biopsy vs debridement pending results  Nursing orders in for wound care.   Antibiotics per Hospital Medicine  Offload heels at all times while resting.   Will follow        Thank you for your consult. I will follow-up with patient. Please contact us if you have any additional questions.    Barbara Orozco DPM  Podiatry  [place] - [facility] Surg   3-5x/week

## 2024-08-14 ENCOUNTER — APPOINTMENT (OUTPATIENT)
Dept: ORTHOPEDIC SURGERY | Facility: CLINIC | Age: 69
End: 2024-08-14
Payer: MEDICARE

## 2024-08-14 DIAGNOSIS — M17.12 UNILATERAL PRIMARY OSTEOARTHRITIS, LEFT KNEE: ICD-10-CM

## 2024-08-14 PROCEDURE — 20610 DRAIN/INJ JOINT/BURSA W/O US: CPT | Mod: LT

## 2024-08-14 PROCEDURE — 99213 OFFICE O/P EST LOW 20 MIN: CPT | Mod: 25

## 2024-08-14 NOTE — ASSESSMENT
[FreeTextEntry1] : wbat shoe modification discussed hardware removal in future if needed requesting knee csi today.  discussed visco in future if desired f/up prn

## 2024-08-14 NOTE — HISTORY OF PRESENT ILLNESS
[8] : 8 [2] : 2 [Sharp] : sharp [Rest] : rest [Walking] : walking [de-identified] : 09/12/2023: h/o ORIF Lisfranc w/ fusion in 2017. was doing well until 1 month ago after a period of a lot of walking/gardening. seeing dr reyes for ongoing knee problem   05/13/2024:  reports ongoing foot pain. since last visit went to PT for foot and knee. also had csi for knee oa w/ complete resolution of foot and knee sx  08/14/2024:  having knee pain. requesting csi. foot w/o issues [] : Post Surgical Visit: no [FreeTextEntry1] : Lt foot

## 2024-08-14 NOTE — PHYSICAL EXAM
[Left] : left foot and ankle [5___] : eversion 5[unfilled]/5 [2+] : dorsalis pedis pulse: 2+ [] : negative anterior drawer at ankle [FreeTextEntry8] : some ttp at palpable hardware

## 2024-08-22 ENCOUNTER — RX RENEWAL (OUTPATIENT)
Age: 69
End: 2024-08-22

## 2024-11-25 ENCOUNTER — TRANSCRIPTION ENCOUNTER (OUTPATIENT)
Age: 69
End: 2024-11-25

## 2024-11-25 ENCOUNTER — APPOINTMENT (OUTPATIENT)
Dept: ORTHOPEDIC SURGERY | Facility: CLINIC | Age: 69
End: 2024-11-25
Payer: MEDICARE

## 2024-11-25 VITALS — WEIGHT: 210 LBS | BODY MASS INDEX: 34.99 KG/M2 | HEIGHT: 65 IN

## 2024-11-25 DIAGNOSIS — M17.12 UNILATERAL PRIMARY OSTEOARTHRITIS, LEFT KNEE: ICD-10-CM

## 2024-11-25 PROCEDURE — 99214 OFFICE O/P EST MOD 30 MIN: CPT | Mod: 25

## 2024-11-25 PROCEDURE — 73564 X-RAY EXAM KNEE 4 OR MORE: CPT | Mod: LT

## 2024-11-25 PROCEDURE — 20610 DRAIN/INJ JOINT/BURSA W/O US: CPT | Mod: LT

## 2024-12-16 ENCOUNTER — RX RENEWAL (OUTPATIENT)
Age: 69
End: 2024-12-16

## 2024-12-31 ENCOUNTER — DOCTOR'S OFFICE (OUTPATIENT)
Facility: LOCATION | Age: 69
Setting detail: OPHTHALMOLOGY
End: 2024-12-31
Payer: MEDICARE

## 2024-12-31 VITALS — HEIGHT: 60 IN

## 2024-12-31 DIAGNOSIS — H43.812: ICD-10-CM

## 2024-12-31 DIAGNOSIS — H00.022: ICD-10-CM

## 2024-12-31 DIAGNOSIS — H00.025: ICD-10-CM

## 2024-12-31 DIAGNOSIS — H35.033: ICD-10-CM

## 2024-12-31 DIAGNOSIS — H26.493: ICD-10-CM

## 2024-12-31 DIAGNOSIS — H01.001: ICD-10-CM

## 2024-12-31 DIAGNOSIS — H35.54: ICD-10-CM

## 2024-12-31 DIAGNOSIS — H16.223: ICD-10-CM

## 2024-12-31 PROCEDURE — 92012 INTRM OPH EXAM EST PATIENT: CPT | Performed by: OPHTHALMOLOGY

## 2024-12-31 ASSESSMENT — REFRACTION_MANIFEST
OS_CYLINDER: 0.00
OS_AXIS: 160
OD_SPHERE: -0.75
OD_CYLINDER: +1.00
OD_AXIS: 070
OS_CYLINDER: +0.25
OS_VA1: 20/20
OS_SPHERE: -0.25
OD_VA1: 20/20
OS_VA1: 20/20
OS_SPHERE: -0.50
OS_AXIS: 000

## 2024-12-31 ASSESSMENT — REFRACTION_CURRENTRX
OS_OVR_VA: 20/
OS_SPHERE: +2.50
OD_SPHERE: +2.50
OD_VPRISM_DIRECTION: SV
OD_OVR_VA: 20/
OD_OVR_VA: 20/
OS_VPRISM_DIRECTION: SV
OS_SPHERE: +1.50
OD_SPHERE: +1.50
OS_OVR_VA: 20/
OS_CYLINDER: SPHERE
OD_CYLINDER: SPHERE

## 2024-12-31 ASSESSMENT — VISUAL ACUITY
OD_BCVA: 20/20-
OS_BCVA: 20/20-

## 2024-12-31 ASSESSMENT — TONOMETRY
OD_IOP_MMHG: 17
OS_IOP_MMHG: 18

## 2025-02-27 ENCOUNTER — APPOINTMENT (OUTPATIENT)
Dept: ORTHOPEDIC SURGERY | Facility: CLINIC | Age: 70
End: 2025-02-27
Payer: MEDICARE

## 2025-02-27 VITALS — WEIGHT: 210 LBS | BODY MASS INDEX: 34.99 KG/M2 | HEIGHT: 65 IN

## 2025-02-27 DIAGNOSIS — M17.12 UNILATERAL PRIMARY OSTEOARTHRITIS, LEFT KNEE: ICD-10-CM

## 2025-02-27 PROCEDURE — 20610 DRAIN/INJ JOINT/BURSA W/O US: CPT | Mod: LT

## 2025-02-27 PROCEDURE — 99213 OFFICE O/P EST LOW 20 MIN: CPT | Mod: 25

## 2025-03-26 NOTE — ED ADULT NURSE NOTE - WILL THE PATIENT ACCEPT THE PFIZER COVID-19 VACCINE IF ELIGIBLE AND IT IS AVAILABLE?
Health Maintenance       Respiratory Syncytial Virus (RSV) Vaccine 60+ (1 - Risk 60-74 years 1-dose series)  Never done    Depression Screening (Yearly)  Overdue since 9/8/2024    Colorectal Cancer Screening (Colonoscopy - Every 5 Years)  Overdue since 9/12/2024           Following review of the above:  Patient is not proceeding with: Colorectal Cancer Screening and Respiratory Syncytial Virus (RSV)    Note: Refer to final orders and clinician documentation.               Not applicable

## 2025-05-08 NOTE — H&P PST ADULT - MALLAMPATI CLASS
Kathy Isabel is calling to request a refill on the following medication(s):    Last Visit Date (If Applicable):  10/1/2024    Next Visit Date:    Visit date not found    Medication Request:  Requested Prescriptions     Pending Prescriptions Disp Refills    FLUoxetine (PROZAC) 10 MG tablet 30 tablet 0     Sig: Take 1 tablet by mouth daily    FLUoxetine (PROZAC) 20 MG tablet 30 tablet 0     Sig: Take 1 tablet by mouth daily With 10 mg tablet.    lamoTRIgine (LAMICTAL) 100 MG tablet 30 tablet 0     Sig: Take 1 tablet by mouth daily    hydrOXYzine HCl (ATARAX) 25 MG tablet 90 tablet 0     Sig: Take 1 tablet by mouth nightly         LVM for Kathy to call the office and schedule an in office appointment      
Mychart scheduling ticket sent.     
Please call patient to schedule an appointment.  
Class I (easy) - visualization of the soft palate, fauces, uvula, and both anterior and posterior pillars

## 2025-05-29 ENCOUNTER — APPOINTMENT (OUTPATIENT)
Dept: ORTHOPEDIC SURGERY | Facility: CLINIC | Age: 70
End: 2025-05-29
Payer: MEDICARE

## 2025-05-29 DIAGNOSIS — M17.12 UNILATERAL PRIMARY OSTEOARTHRITIS, LEFT KNEE: ICD-10-CM

## 2025-05-29 PROCEDURE — 99213 OFFICE O/P EST LOW 20 MIN: CPT | Mod: 25

## 2025-05-29 PROCEDURE — 20610 DRAIN/INJ JOINT/BURSA W/O US: CPT | Mod: LT

## 2025-07-02 ENCOUNTER — TRANSCRIPTION ENCOUNTER (OUTPATIENT)
Age: 70
End: 2025-07-02

## 2025-07-02 ENCOUNTER — OUTPATIENT (OUTPATIENT)
Dept: OUTPATIENT SERVICES | Facility: HOSPITAL | Age: 70
LOS: 1 days | End: 2025-07-02
Payer: MEDICARE

## 2025-07-02 VITALS
SYSTOLIC BLOOD PRESSURE: 166 MMHG | DIASTOLIC BLOOD PRESSURE: 71 MMHG | OXYGEN SATURATION: 97 % | RESPIRATION RATE: 15 BRPM | HEART RATE: 61 BPM

## 2025-07-02 VITALS
TEMPERATURE: 98 F | SYSTOLIC BLOOD PRESSURE: 114 MMHG | DIASTOLIC BLOOD PRESSURE: 82 MMHG | WEIGHT: 201.94 LBS | RESPIRATION RATE: 16 BRPM | OXYGEN SATURATION: 98 % | HEIGHT: 65 IN | HEART RATE: 76 BPM

## 2025-07-02 VITALS
OXYGEN SATURATION: 97 % | HEART RATE: 58 BPM | SYSTOLIC BLOOD PRESSURE: 238 MMHG | DIASTOLIC BLOOD PRESSURE: 104 MMHG | RESPIRATION RATE: 18 BRPM

## 2025-07-02 DIAGNOSIS — Z98.89 OTHER SPECIFIED POSTPROCEDURAL STATES: Chronic | ICD-10-CM

## 2025-07-02 DIAGNOSIS — R06.09 OTHER FORMS OF DYSPNEA: ICD-10-CM

## 2025-07-02 DIAGNOSIS — Z90.89 ACQUIRED ABSENCE OF OTHER ORGANS: Chronic | ICD-10-CM

## 2025-07-02 DIAGNOSIS — I25.10 ATHEROSCLEROTIC HEART DISEASE OF NATIVE CORONARY ARTERY WITHOUT ANGINA PECTORIS: ICD-10-CM

## 2025-07-02 DIAGNOSIS — G47.33 OBSTRUCTIVE SLEEP APNEA (ADULT) (PEDIATRIC): ICD-10-CM

## 2025-07-02 LAB
ALBUMIN SERPL ELPH-MCNC: 3.8 G/DL — SIGNIFICANT CHANGE UP (ref 3.3–5)
ALP SERPL-CCNC: 58 U/L — SIGNIFICANT CHANGE UP (ref 40–120)
ALT FLD-CCNC: 24 U/L — SIGNIFICANT CHANGE UP (ref 10–45)
ANION GAP SERPL CALC-SCNC: 13 MMOL/L — SIGNIFICANT CHANGE UP (ref 5–17)
AST SERPL-CCNC: 19 U/L — SIGNIFICANT CHANGE UP (ref 10–40)
BILIRUB SERPL-MCNC: 0.6 MG/DL — SIGNIFICANT CHANGE UP (ref 0.2–1.2)
BLD GP AB SCN SERPL QL: NEGATIVE — SIGNIFICANT CHANGE UP
BUN SERPL-MCNC: 24 MG/DL — HIGH (ref 7–23)
CALCIUM SERPL-MCNC: 9.4 MG/DL — SIGNIFICANT CHANGE UP (ref 8.4–10.5)
CHLORIDE SERPL-SCNC: 104 MMOL/L — SIGNIFICANT CHANGE UP (ref 96–108)
CO2 SERPL-SCNC: 24 MMOL/L — SIGNIFICANT CHANGE UP (ref 22–31)
CREAT SERPL-MCNC: 1.3 MG/DL — SIGNIFICANT CHANGE UP (ref 0.5–1.3)
EGFR: 59 ML/MIN/1.73M2 — LOW
EGFR: 59 ML/MIN/1.73M2 — LOW
GLUCOSE SERPL-MCNC: 97 MG/DL — SIGNIFICANT CHANGE UP (ref 70–99)
HCT VFR BLD CALC: 45.6 % — SIGNIFICANT CHANGE UP (ref 39–50)
HGB BLD-MCNC: 15.1 G/DL — SIGNIFICANT CHANGE UP (ref 13–17)
MCHC RBC-ENTMCNC: 27.6 PG — SIGNIFICANT CHANGE UP (ref 27–34)
MCHC RBC-ENTMCNC: 33.1 G/DL — SIGNIFICANT CHANGE UP (ref 32–36)
MCV RBC AUTO: 83.4 FL — SIGNIFICANT CHANGE UP (ref 80–100)
NRBC # BLD AUTO: 0 K/UL — SIGNIFICANT CHANGE UP (ref 0–0)
NRBC # FLD: 0 K/UL — SIGNIFICANT CHANGE UP (ref 0–0)
NRBC BLD AUTO-RTO: 0 /100 WBCS — SIGNIFICANT CHANGE UP (ref 0–0)
NT-PROBNP SERPL-SCNC: 1184 PG/ML — HIGH (ref 0–300)
PLATELET # BLD AUTO: 203 K/UL — SIGNIFICANT CHANGE UP (ref 150–400)
PMV BLD: 10.1 FL — SIGNIFICANT CHANGE UP (ref 7–13)
POTASSIUM SERPL-MCNC: 3.3 MMOL/L — LOW (ref 3.5–5.3)
POTASSIUM SERPL-SCNC: 3.3 MMOL/L — LOW (ref 3.5–5.3)
PROT SERPL-MCNC: 6.5 G/DL — SIGNIFICANT CHANGE UP (ref 6–8.3)
RBC # BLD: 5.47 M/UL — SIGNIFICANT CHANGE UP (ref 4.2–5.8)
RBC # FLD: 13.3 % — SIGNIFICANT CHANGE UP (ref 10.3–14.5)
RH IG SCN BLD-IMP: POSITIVE — SIGNIFICANT CHANGE UP
SODIUM SERPL-SCNC: 141 MMOL/L — SIGNIFICANT CHANGE UP (ref 135–145)
WBC # BLD: 8.06 K/UL — SIGNIFICANT CHANGE UP (ref 3.8–10.5)
WBC # FLD AUTO: 8.06 K/UL — SIGNIFICANT CHANGE UP (ref 3.8–10.5)

## 2025-07-02 PROCEDURE — 86901 BLOOD TYPING SEROLOGIC RH(D): CPT

## 2025-07-02 PROCEDURE — C1894: CPT

## 2025-07-02 PROCEDURE — 86850 RBC ANTIBODY SCREEN: CPT

## 2025-07-02 PROCEDURE — 87641 MR-STAPH DNA AMP PROBE: CPT

## 2025-07-02 PROCEDURE — 93458 L HRT ARTERY/VENTRICLE ANGIO: CPT

## 2025-07-02 PROCEDURE — 86900 BLOOD TYPING SEROLOGIC ABO: CPT

## 2025-07-02 PROCEDURE — 87640 STAPH A DNA AMP PROBE: CPT

## 2025-07-02 PROCEDURE — 83036 HEMOGLOBIN GLYCOSYLATED A1C: CPT

## 2025-07-02 PROCEDURE — 93880 EXTRACRANIAL BILAT STUDY: CPT

## 2025-07-02 PROCEDURE — 93880 EXTRACRANIAL BILAT STUDY: CPT | Mod: 26

## 2025-07-02 PROCEDURE — 83880 ASSAY OF NATRIURETIC PEPTIDE: CPT

## 2025-07-02 PROCEDURE — 71046 X-RAY EXAM CHEST 2 VIEWS: CPT | Mod: 26

## 2025-07-02 PROCEDURE — 93005 ELECTROCARDIOGRAM TRACING: CPT

## 2025-07-02 PROCEDURE — C1769: CPT

## 2025-07-02 PROCEDURE — 71046 X-RAY EXAM CHEST 2 VIEWS: CPT

## 2025-07-02 PROCEDURE — C1887: CPT

## 2025-07-02 PROCEDURE — G0463: CPT

## 2025-07-02 PROCEDURE — 85027 COMPLETE CBC AUTOMATED: CPT

## 2025-07-02 PROCEDURE — 80053 COMPREHEN METABOLIC PANEL: CPT

## 2025-07-02 PROCEDURE — 93010 ELECTROCARDIOGRAM REPORT: CPT

## 2025-07-02 RX ORDER — LABETALOL HYDROCHLORIDE 200 MG/1
200 TABLET, FILM COATED ORAL ONCE
Refills: 0 | Status: DISCONTINUED | OUTPATIENT
Start: 2025-07-02 | End: 2025-07-02

## 2025-07-02 RX ORDER — CEFUROXIME SODIUM 1.5 G
1500 VIAL (EA) INJECTION ONCE
Refills: 0 | Status: DISCONTINUED | OUTPATIENT
Start: 2025-07-11 | End: 2025-07-11

## 2025-07-02 RX ORDER — ASPIRIN 325 MG
81 TABLET ORAL ONCE
Refills: 0 | Status: COMPLETED | OUTPATIENT
Start: 2025-07-02 | End: 2025-07-02

## 2025-07-02 RX ORDER — LABETALOL HYDROCHLORIDE 200 MG/1
1 TABLET, FILM COATED ORAL
Qty: 60 | Refills: 0
Start: 2025-07-02

## 2025-07-02 RX ADMIN — Medication 500 MILLILITER(S): at 07:57

## 2025-07-02 RX ADMIN — Medication 0.1 MILLIGRAM(S): at 11:19

## 2025-07-02 RX ADMIN — Medication 81 MILLIGRAM(S): at 07:56

## 2025-07-02 RX ADMIN — Medication 10 MILLIGRAM(S): at 14:16

## 2025-07-02 RX ADMIN — Medication 40 MILLIEQUIVALENT(S): at 09:22

## 2025-07-02 RX ADMIN — Medication 0.1 MILLIGRAM(S): at 14:42

## 2025-07-02 RX ADMIN — Medication 75 MILLILITER(S): at 07:56

## 2025-07-02 RX ADMIN — Medication 10 MILLIGRAM(S): at 07:56

## 2025-07-02 RX ADMIN — Medication 25 MILLIGRAM(S): at 12:27

## 2025-07-02 NOTE — H&P PST ADULT - NSICDXPASTMEDICALHX_GEN_ALL_CORE_FT
PAST MEDICAL HISTORY:  CAD (coronary artery disease)     HLD (hyperlipidemia)     Hypertension     Obesity (BMI 30.0-34.9)     HIGINIO (obstructive sleep apnea)

## 2025-07-02 NOTE — ASU DISCHARGE PLAN (ADULT/PEDIATRIC) - PROVIDER TOKENS
PROVIDER:[TOKEN:[[id number]],FOLLOWUP:[2 weeks]] PROVIDER:[TOKEN:[[id number]],FOLLOWUP:[1-3 days]]

## 2025-07-02 NOTE — H&P PST ADULT - HEMATOLOGY/LYMPHATICS
[facility]    Progress Note    Dashawn Goodness Patient Status:  Inpatient    1936 MRN [id number]   Location [facility] 4W/SW/SE Attending Dot Oconnor MD   Hosp Day # 14 PCP Cynthia Vargas       Subjective:   An Castro 09/17/2020    BILT 0.5 09/17/2020    TP 6.7 09/17/2020    AST 19 09/17/2020    ALT 12 (L) 09/17/2020    LIP 89 09/07/2020    CRP 1.22 (H) 03/20/2020    MG 2.4 09/21/2020    PHOS 2.9 09/21/2020    TROP <0.045 04/04/2020       Current Medications:    •  adul (Porcine) 5000 UNIT/ML injection 5,000 Units, 5,000 Units, Subcutaneous, 2 times per day    •  ondansetron HCl (ZOFRAN) injection 4 mg, 4 mg, Intravenous, Q6H PRN    •  Metoclopramide HCl (REGLAN) injection 5 mg, 5 mg, Intravenous, Q8H PRN    •  acetaminop fraction was 55-60%, by visual     assessment. Wall motion was normal; there were no regional wall     motion abnormalities. Doppler parameters are consistent with     abnormal left ventricular relaxation (grade 1 diastolic     dysfunction).   2. Aortic drew details…

## 2025-07-02 NOTE — ASU DISCHARGE PLAN (ADULT/PEDIATRIC) - ASU DC SPECIAL INSTRUCTIONSFT
You are going to presurgical testing from the cath lab for your upcoming surgery given findings of triple vessel disease as discussed with you by Dr. Manjarrez and Dr. Ventura Your blood pressure was high today requiring several medications to bring it down.  We added labetalol 200mg twice a day.  Please follow up with Dr. Goldberg tomorrow for a blood pressure check.  Please check you blood pressure at home if you have a machine, if you persist in the 200s for several hours please return to the emergency room.     You are going to presurgical testing from the cath lab for your upcoming surgery given findings of triple vessel disease as discussed with you by Dr. Manjarrez and Dr. Ventura Your blood pressure was high today requiring several medications to bring it down.  We added hydralazine 25mg four times a day.  Please follow up with Dr. Goldberg tomorrow @11am for a blood pressure check.  Please check you blood pressure at home if you have a machine, if you persist in the 200s for several hours please return to the emergency room.     You are going to presurgical testing from the cath lab for your upcoming surgery given findings of triple vessel disease as discussed with you by Dr. Manjarrez and Dr. Ventura

## 2025-07-02 NOTE — H&P PST ADULT - HISTORY OF PRESENT ILLNESS
70 year old Male w/ PMHx of HTN, HLD, HIGINIO (noncompliant w/ CPAP). C/o SOB/LOERA over the past year that has worsen over time. Pt found to have high calcium score on CT performed 7/2/25.  Denies headaches, changes in vision, dizziness, chest pain, palpitations, abdominal pain, N/V/D, leg pain, edema, hematuria, BRBPR, melena or any other complaints. He now presents to PST prior to scheduled Coronary Artery Bypass Graft X3 with Dr. Ventura on 7/11/23.

## 2025-07-02 NOTE — ASU PATIENT PROFILE, ADULT - FALL HARM RISK - FALL HARM RISK
Requested medication(s) are due for refill today: Yes  Patient has already received a courtesy refill: No  Other reason request has been forwarded to provider:
No indicators present

## 2025-07-02 NOTE — H&P PST ADULT - PROBLEM SELECTOR PLAN 1
"left midfoot fusion/ORIF TMT dislocation ORIF left tarsal dislocation" on 3/31/17.  Patient to obtain medical clearance.  pre op instructions were reviewed and signed.    Respiratory consult requested. Pt. scheduled for CABGX3 with Dr. Ventura on 7/11/25.  Pre-op instructions given, all questions answered.  Surgical Soap given.  Labs: T&S, BNP. MRSa. A1C, Carotid, CXR  **CBC, CMP  performed by CT surgery (no anemia noted on result)

## 2025-07-02 NOTE — H&P PST ADULT - ASSESSMENT
62 yo male is scheduled for left foot surgery. DASI Score: 5.72  DASI Activity: able to was dress self, able to go up one flight slowly  Loose or removable teeth: denies   CAPRINI SCORE    AGE RELATED RISK FACTORS                                                             [ ] Age 41-60 years                                            (1 Point)  [x ] Age: 61-74 years                                           (2 Points)                 [ ] Age= 75 years                                                (3 Points)             DISEASE RELATED RISK FACTORS                                                       [ ] Edema in the lower extremities                 (1 Point)                     [ ] Varicose veins                                               (1 Point)                                 [ x] BMI > 25 Kg/m2                                            (1 Point)                                  [ ] Serious infection (ie PNA)                            (1 Point)                     [ ] Lung disease ( COPD, Emphysema)            (1 Point)                                                                          [ ] Acute myocardial infarction                         (1 Point)                  [ ] Congestive heart failure (in the previous month)  (1 Point)         [ ] Inflammatory bowel disease                            (1 Point)                  [ ] Central venous access, PICC or Port               (2 points)       (within the last month)                                                                [ ] Stroke (in the previous month)                        (5 Points)    [ ] Previous or present malignancy                       (2 points)                                                                                                                                                         HEMATOLOGY RELATED FACTORS                                                         [ ] Prior episodes of VTE                                     (3 Points)                     [ ] Positive family history for VTE                      (3 Points)                  [ ] Prothrombin 23621 A                                     (3 Points)                     [ ] Factor V Leiden                                                (3 Points)                        [ ] Lupus anticoagulants                                      (3 Points)                                                           [ ] Anticardiolipin antibodies                              (3 Points)                                                       [ ] High homocysteine in the blood                   (3 Points)                                             [ ] Other congenital or acquired thrombophilia      (3 Points)                                                [ ] Heparin induced thrombocytopenia                  (3 Points)                                        MOBILITY RELATED FACTORS  [ ] Bed rest                                                         (1 Point)  [ ] Plaster cast                                                    (2 points)  [ ] Bed bound for more than 72 hours           (2 Points)    GENDER SPECIFIC FACTORS  [ ] Pregnancy or had a baby within the last month   (1 Point)  [ ] Post-partum < 6 weeks                                   (1 Point)  [ ] Hormonal therapy  or oral contraception   (1 Point)  [ ] History of pregnancy complications              (1 point)  [ ] Unexplained or recurrent              (1 Point)    OTHER RISK FACTORS                                           (1 Point)  [ ] BMI >40, smoking, diabetes requiring insulin, chemotherapy  blood transfusions and length of surgery over 2 hours    SURGERY RELATED RISK FACTORS  [ ]  Section within the last month     (1 Point)  [ ] Minor surgery                                                  (1 Point)  [ ] Arthroscopic surgery                                       (2 Points)  [x ] Planned major surgery lasting more            (2 Points)      than 45 minutes     [ ] Elective hip or knee joint replacement       (5 points)       surgery                                                TRAUMA RELATED RISK FACTORS  [ ] Fracture of the hip, pelvis, or leg                       (5 Points)  [ ] Spinal cord injury resulting in paralysis             (5 points)       (in the previous month)    [ ] Paralysis  (less than 1 month)                             (5 Points)  [ ] Multiple Trauma within 1 month                        (5 Points)    Total Score [ 5 ]    Caprini Score 0-2: Low Risk, NO VTE prophylaxis required for most patients, encourage ambulation  Caprini Score 3-6: Moderate Risk , pharmacologic VTE prophylaxis is indicated for most patients (in the absence of contraindications)  Caprini Score Greater than or =7: High risk, pharmocologic VTE prophylaxis indicated for most patients (in the absence of contraindications)

## 2025-07-02 NOTE — H&P CARDIOLOGY - NSICDXPASTMEDICALHX_GEN_ALL_CORE_FT
PAST MEDICAL HISTORY:  HLD (hyperlipidemia)     Hypertension     Obesity (BMI 30.0-34.9)     HIGINIO (obstructive sleep apnea)

## 2025-07-02 NOTE — H&P PST ADULT - ATTENDING COMMENTS
severe TVD, needs multivessel CABG severe TVD, needs multivessel CABG    [id number]:3 (age 70, HTN, CAD) and elevated ProBNP    LeAAPS candidate

## 2025-07-02 NOTE — H&P PST ADULT - NEGATIVE GASTROINTESTINAL SYMPTOMS
no change in bowel habits/no abdominal pain no nausea/no vomiting/no diarrhea/no constipation/no change in bowel habits/no abdominal pain

## 2025-07-02 NOTE — H&P PST ADULT - MUSCULOSKELETAL
no joint swelling/no joint erythema/no joint warmth/no calf tenderness details… strength 5/5 bilateral upper extremities/strength 5/5 bilateral lower extremities

## 2025-07-02 NOTE — ASU DISCHARGE PLAN (ADULT/PEDIATRIC) - CARE PROVIDER_API CALL
Goldberg, Steven M  Cardiovascular Disease  [address] [place] [id number]  Phone: [phone]  Fax: [phone]  Follow Up Time: 2 weeks   Goldberg, Steven M  Cardiovascular Disease  [address] [place] [id number]  Phone: [phone]  Fax: [phone]  Follow Up Time: 1-3 days

## 2025-07-02 NOTE — H&P CARDIOLOGY - HISTORY OF PRESENT ILLNESS
Cardiologist: Dr. Steven Goldberg   Pharmacy: [facility] ([address], [place] [address])        70 year old Male w/ PMHx of HTN, HLD, HIGINIO (noncompliant w/ CPAP) initially presented to outpatient cardiologist for evaluation of SOB/LOERA. Patient noted to have high calcium score on CT, equivocal CTA 2023 and suboptimal stress echo in 2024. Pt was referred to interventional cardiology for evaluation of invasive angiography. Pt was started on ASA 81mg qd approximately 1 week ago; endorses good compliance - last dose 7/1AM.  In light of pt's risk factors, CCS class II anginal-equivalent symptoms; pt referred to [facility] for recommended cardiac catheterization w/ possible intervention if clinically indicated. Denies headaches, changes in vision, dizziness, chest pain, palpitations, abdominal pain, N/V/D, leg pain, edema, hematuria, BRBPR, melena or any other complaints.

## 2025-07-02 NOTE — H&P PST ADULT - FUNCTIONAL STATUS
Alert-The patient is alert, awake and responds to voice. The patient is oriented to time, place, and person. The triage nurse is able to obtain subjective information. 4-10 METS

## 2025-07-03 ENCOUNTER — NON-APPOINTMENT (OUTPATIENT)
Age: 70
End: 2025-07-03

## 2025-07-03 PROBLEM — I25.10 CAD, MULTIPLE VESSEL: Status: ACTIVE | Noted: 2025-07-03

## 2025-07-03 LAB
A1C WITH ESTIMATED AVERAGE GLUCOSE RESULT: 5.3 % — SIGNIFICANT CHANGE UP (ref 4–5.6)
ESTIMATED AVERAGE GLUCOSE: 105 MG/DL — SIGNIFICANT CHANGE UP (ref 68–114)
MRSA PCR RESULT.: SIGNIFICANT CHANGE UP
S AUREUS DNA NOSE QL NAA+PROBE: SIGNIFICANT CHANGE UP

## 2025-07-03 RX ORDER — SPIRONOLACTONE 25 MG/1
25 TABLET ORAL
Qty: 30 | Refills: 0 | Status: ACTIVE | COMMUNITY
Start: 2025-07-03 | End: 1900-01-01

## 2025-07-10 ENCOUNTER — APPOINTMENT (OUTPATIENT)
Dept: ORTHOPEDIC SURGERY | Facility: CLINIC | Age: 70
End: 2025-07-10

## 2025-07-10 PROBLEM — E78.5 HYPERLIPIDEMIA, UNSPECIFIED: Chronic | Status: ACTIVE | Noted: 2025-07-02

## 2025-07-10 PROBLEM — G47.33 OBSTRUCTIVE SLEEP APNEA (ADULT) (PEDIATRIC): Chronic | Status: ACTIVE | Noted: 2025-07-02

## 2025-07-10 PROBLEM — I25.10 ATHEROSCLEROTIC HEART DISEASE OF NATIVE CORONARY ARTERY WITHOUT ANGINA PECTORIS: Chronic | Status: ACTIVE | Noted: 2025-07-02

## 2025-07-11 ENCOUNTER — APPOINTMENT (OUTPATIENT)
Dept: CARDIOTHORACIC SURGERY | Facility: HOSPITAL | Age: 70
End: 2025-07-11

## 2025-07-11 ENCOUNTER — RESULT REVIEW (OUTPATIENT)
Age: 70
End: 2025-07-11

## 2025-07-11 ENCOUNTER — INPATIENT (INPATIENT)
Facility: HOSPITAL | Age: 70
LOS: 10 days | Discharge: HOME HEALTH | DRG: 303 | End: 2025-07-22
Attending: THORACIC SURGERY (CARDIOTHORACIC VASCULAR SURGERY) | Admitting: THORACIC SURGERY (CARDIOTHORACIC VASCULAR SURGERY)
Payer: MEDICARE

## 2025-07-11 ENCOUNTER — TRANSCRIPTION ENCOUNTER (OUTPATIENT)
Age: 70
End: 2025-07-11

## 2025-07-11 VITALS
SYSTOLIC BLOOD PRESSURE: 189 MMHG | TEMPERATURE: 98 F | WEIGHT: 203.93 LBS | HEART RATE: 61 BPM | RESPIRATION RATE: 16 BRPM | HEIGHT: 60 IN | OXYGEN SATURATION: 95 % | DIASTOLIC BLOOD PRESSURE: 73 MMHG

## 2025-07-11 DIAGNOSIS — Z90.89 ACQUIRED ABSENCE OF OTHER ORGANS: Chronic | ICD-10-CM

## 2025-07-11 DIAGNOSIS — Z98.89 OTHER SPECIFIED POSTPROCEDURAL STATES: Chronic | ICD-10-CM

## 2025-07-11 DIAGNOSIS — I25.10 ATHEROSCLEROTIC HEART DISEASE OF NATIVE CORONARY ARTERY WITHOUT ANGINA PECTORIS: ICD-10-CM

## 2025-07-11 LAB
ALBUMIN SERPL ELPH-MCNC: 2.5 G/DL — LOW (ref 3.3–5)
ALP SERPL-CCNC: 28 U/L — LOW (ref 40–120)
ALT FLD-CCNC: 12 U/L — SIGNIFICANT CHANGE UP (ref 10–45)
ANION GAP SERPL CALC-SCNC: 11 MMOL/L — SIGNIFICANT CHANGE UP (ref 5–17)
APTT BLD: 34.5 SEC — SIGNIFICANT CHANGE UP (ref 26.1–36.8)
AST SERPL-CCNC: 30 U/L — SIGNIFICANT CHANGE UP (ref 10–40)
BASE EXCESS BLDV CALC-SCNC: -2 MMOL/L — SIGNIFICANT CHANGE UP (ref -2–3)
BASE EXCESS BLDV CALC-SCNC: -2 MMOL/L — SIGNIFICANT CHANGE UP (ref -2–3)
BASE EXCESS BLDV CALC-SCNC: -4.5 MMOL/L — LOW (ref -2–3)
BASOPHILS # BLD AUTO: 0.03 K/UL — SIGNIFICANT CHANGE UP (ref 0–0.2)
BASOPHILS NFR BLD AUTO: 0.3 % — SIGNIFICANT CHANGE UP (ref 0–2)
BILIRUB SERPL-MCNC: 1.3 MG/DL — HIGH (ref 0.2–1.2)
BUN SERPL-MCNC: 15 MG/DL — SIGNIFICANT CHANGE UP (ref 7–23)
CALCIUM SERPL-MCNC: 8 MG/DL — LOW (ref 8.4–10.5)
CHLORIDE SERPL-SCNC: 113 MMOL/L — HIGH (ref 96–108)
CK MB BLD-MCNC: 8.1 % — HIGH (ref 0–3.5)
CK MB CFR SERPL CALC: 28.6 NG/ML — HIGH (ref 0–6.7)
CK SERPL-CCNC: 353 U/L — HIGH (ref 30–200)
CO2 BLDV-SCNC: 22 MMOL/L — SIGNIFICANT CHANGE UP (ref 22–26)
CO2 BLDV-SCNC: 23 MMOL/L — SIGNIFICANT CHANGE UP (ref 22–26)
CO2 BLDV-SCNC: 24 MMOL/L — SIGNIFICANT CHANGE UP (ref 22–26)
CO2 SERPL-SCNC: 20 MMOL/L — LOW (ref 22–31)
CREAT SERPL-MCNC: 1.11 MG/DL — SIGNIFICANT CHANGE UP (ref 0.5–1.3)
EGFR: 71 ML/MIN/1.73M2 — SIGNIFICANT CHANGE UP
EGFR: 71 ML/MIN/1.73M2 — SIGNIFICANT CHANGE UP
EOSINOPHIL # BLD AUTO: 0.09 K/UL — SIGNIFICANT CHANGE UP (ref 0–0.5)
EOSINOPHIL NFR BLD AUTO: 0.8 % — SIGNIFICANT CHANGE UP (ref 0–6)
FIBRINOGEN PPP-MCNC: 157 MG/DL — LOW (ref 200–445)
GAS PNL BLDA: SIGNIFICANT CHANGE UP
GAS PNL BLDV: SIGNIFICANT CHANGE UP
GLUCOSE SERPL-MCNC: 98 MG/DL — SIGNIFICANT CHANGE UP (ref 70–99)
HCO3 BLDV-SCNC: 21 MMOL/L — LOW (ref 22–29)
HCO3 BLDV-SCNC: 22 MMOL/L — SIGNIFICANT CHANGE UP (ref 22–29)
HCO3 BLDV-SCNC: 23 MMOL/L — SIGNIFICANT CHANGE UP (ref 22–29)
HCT VFR BLD CALC: 20.9 % — CRITICAL LOW (ref 39–50)
HCT VFR BLD CALC: 27.9 % — LOW (ref 39–50)
HEPARINASE TEG R TIME: 7.2 MIN — SIGNIFICANT CHANGE UP (ref 4.3–8.3)
HEPARINASE TEG R TIME: 8.3 MIN — SIGNIFICANT CHANGE UP (ref 4.3–8.3)
HGB BLD-MCNC: 7.2 G/DL — LOW (ref 13–17)
HGB BLD-MCNC: 9.5 G/DL — LOW (ref 13–17)
HOROWITZ INDEX BLDV+IHG-RTO: 100 — SIGNIFICANT CHANGE UP
HOROWITZ INDEX BLDV+IHG-RTO: 60 — SIGNIFICANT CHANGE UP
IMM GRANULOCYTES # BLD AUTO: 0.12 K/UL — HIGH (ref 0–0.07)
IMM GRANULOCYTES NFR BLD AUTO: 1 % — HIGH (ref 0–0.9)
INR BLD: 1.7 RATIO — HIGH (ref 0.85–1.16)
LYMPHOCYTES # BLD AUTO: 2.22 K/UL — SIGNIFICANT CHANGE UP (ref 1–3.3)
LYMPHOCYTES NFR BLD AUTO: 19 % — SIGNIFICANT CHANGE UP (ref 13–44)
MCHC RBC-ENTMCNC: 30.8 PG — SIGNIFICANT CHANGE UP (ref 27–34)
MCHC RBC-ENTMCNC: 31 PG — SIGNIFICANT CHANGE UP (ref 27–34)
MCHC RBC-ENTMCNC: 34.1 G/DL — SIGNIFICANT CHANGE UP (ref 32–36)
MCHC RBC-ENTMCNC: 34.4 G/DL — SIGNIFICANT CHANGE UP (ref 32–36)
MCV RBC AUTO: 89.3 FL — SIGNIFICANT CHANGE UP (ref 80–100)
MCV RBC AUTO: 91.2 FL — SIGNIFICANT CHANGE UP (ref 80–100)
MONOCYTES # BLD AUTO: 0.52 K/UL — SIGNIFICANT CHANGE UP (ref 0–0.9)
MONOCYTES NFR BLD AUTO: 4.5 % — SIGNIFICANT CHANGE UP (ref 2–14)
NEUTROPHILS # BLD AUTO: 8.69 K/UL — HIGH (ref 1.8–7.4)
NEUTROPHILS NFR BLD AUTO: 74.4 % — SIGNIFICANT CHANGE UP (ref 43–77)
NRBC # BLD AUTO: 0 K/UL — SIGNIFICANT CHANGE UP (ref 0–0)
NRBC # BLD AUTO: 0 K/UL — SIGNIFICANT CHANGE UP (ref 0–0)
NRBC # FLD: 0 K/UL — SIGNIFICANT CHANGE UP (ref 0–0)
NRBC # FLD: 0 K/UL — SIGNIFICANT CHANGE UP (ref 0–0)
NRBC BLD AUTO-RTO: 0 /100 WBCS — SIGNIFICANT CHANGE UP (ref 0–0)
NRBC BLD AUTO-RTO: 0 /100 WBCS — SIGNIFICANT CHANGE UP (ref 0–0)
PCO2 BLDV: 36 MMHG — LOW (ref 42–55)
PCO2 BLDV: 40 MMHG — LOW (ref 42–55)
PCO2 BLDV: 40 MMHG — LOW (ref 42–55)
PH BLDV: 7.33 — SIGNIFICANT CHANGE UP (ref 7.32–7.43)
PH BLDV: 7.37 — SIGNIFICANT CHANGE UP (ref 7.32–7.43)
PH BLDV: 7.4 — SIGNIFICANT CHANGE UP (ref 7.32–7.43)
PLATELET # BLD AUTO: 138 K/UL — LOW (ref 150–400)
PLATELET # BLD AUTO: 153 K/UL — SIGNIFICANT CHANGE UP (ref 150–400)
PMV BLD: 9.7 FL — SIGNIFICANT CHANGE UP (ref 7–13)
PMV BLD: 9.8 FL — SIGNIFICANT CHANGE UP (ref 7–13)
PO2 BLDV: 35 MMHG — SIGNIFICANT CHANGE UP (ref 25–45)
PO2 BLDV: 40 MMHG — SIGNIFICANT CHANGE UP (ref 25–45)
PO2 BLDV: 40 MMHG — SIGNIFICANT CHANGE UP (ref 25–45)
POTASSIUM SERPL-MCNC: 3.9 MMOL/L — SIGNIFICANT CHANGE UP (ref 3.5–5.3)
POTASSIUM SERPL-SCNC: 3.9 MMOL/L — SIGNIFICANT CHANGE UP (ref 3.5–5.3)
PROT SERPL-MCNC: 3.4 G/DL — LOW (ref 6–8.3)
PROTHROM AB SERPL-ACNC: 19.5 SEC — HIGH (ref 9.9–13.4)
RAPIDTEG MAXIMUM AMPLITUDE: 48.8 MM — LOW (ref 52–70)
RAPIDTEG MAXIMUM AMPLITUDE: 59.2 MM — SIGNIFICANT CHANGE UP (ref 52–70)
RBC # BLD: 2.34 M/UL — LOW (ref 4.2–5.8)
RBC # BLD: 3.06 M/UL — LOW (ref 4.2–5.8)
RBC # FLD: 14.3 % — SIGNIFICANT CHANGE UP (ref 10.3–14.5)
RBC # FLD: 14.6 % — HIGH (ref 10.3–14.5)
RH IG SCN BLD-IMP: POSITIVE — SIGNIFICANT CHANGE UP
SAO2 % BLDV: 65.1 % — LOW (ref 67–88)
SAO2 % BLDV: 74.1 % — SIGNIFICANT CHANGE UP (ref 67–88)
SAO2 % BLDV: 74.8 % — SIGNIFICANT CHANGE UP (ref 67–88)
SODIUM SERPL-SCNC: 144 MMOL/L — SIGNIFICANT CHANGE UP (ref 135–145)
TEG FUNCTIONAL FIBRINOGEN: 15.8 MM — SIGNIFICANT CHANGE UP (ref 15–32)
TEG FUNCTIONAL FIBRINOGEN: 19.2 MM — SIGNIFICANT CHANGE UP (ref 15–32)
TEG MAXIMUM AMPLITUDE: 51.7 MM — LOW (ref 52–69)
TEG MAXIMUM AMPLITUDE: 55.7 MM — SIGNIFICANT CHANGE UP (ref 52–69)
TEG REACTION TIME: 11.3 MIN — HIGH (ref 4.6–9.1)
TEG REACTION TIME: 8.1 MIN — SIGNIFICANT CHANGE UP (ref 4.6–9.1)
TROPONIN T, HIGH SENSITIVITY RESULT: 829 NG/L — HIGH (ref 0–51)
WBC # BLD: 11.67 K/UL — HIGH (ref 3.8–10.5)
WBC # BLD: 12.17 K/UL — HIGH (ref 3.8–10.5)
WBC # FLD AUTO: 11.67 K/UL — HIGH (ref 3.8–10.5)
WBC # FLD AUTO: 12.17 K/UL — HIGH (ref 3.8–10.5)

## 2025-07-11 PROCEDURE — 71045 X-RAY EXAM CHEST 1 VIEW: CPT | Mod: 26

## 2025-07-11 PROCEDURE — 36415 COLL VENOUS BLD VENIPUNCTURE: CPT

## 2025-07-11 PROCEDURE — P9045: CPT

## 2025-07-11 PROCEDURE — 85027 COMPLETE CBC AUTOMATED: CPT

## 2025-07-11 PROCEDURE — 33533 CABG ARTERIAL SINGLE: CPT

## 2025-07-11 PROCEDURE — 33508 ENDOSCOPIC VEIN HARVEST: CPT | Mod: 59

## 2025-07-11 PROCEDURE — 82435 ASSAY OF BLOOD CHLORIDE: CPT

## 2025-07-11 PROCEDURE — 85730 THROMBOPLASTIN TIME PARTIAL: CPT

## 2025-07-11 PROCEDURE — 82553 CREATINE MB FRACTION: CPT

## 2025-07-11 PROCEDURE — 85014 HEMATOCRIT: CPT

## 2025-07-11 PROCEDURE — 82947 ASSAY GLUCOSE BLOOD QUANT: CPT

## 2025-07-11 PROCEDURE — 85610 PROTHROMBIN TIME: CPT

## 2025-07-11 PROCEDURE — 84295 ASSAY OF SERUM SODIUM: CPT

## 2025-07-11 PROCEDURE — 71045 X-RAY EXAM CHEST 1 VIEW: CPT

## 2025-07-11 PROCEDURE — 84132 ASSAY OF SERUM POTASSIUM: CPT

## 2025-07-11 PROCEDURE — 82550 ASSAY OF CK (CPK): CPT

## 2025-07-11 PROCEDURE — 94002 VENT MGMT INPAT INIT DAY: CPT

## 2025-07-11 PROCEDURE — 85025 COMPLETE CBC W/AUTO DIFF WBC: CPT

## 2025-07-11 PROCEDURE — 83605 ASSAY OF LACTIC ACID: CPT

## 2025-07-11 PROCEDURE — 85018 HEMOGLOBIN: CPT

## 2025-07-11 PROCEDURE — 85520 HEPARIN ASSAY: CPT

## 2025-07-11 PROCEDURE — 80053 COMPREHEN METABOLIC PANEL: CPT

## 2025-07-11 PROCEDURE — 82962 GLUCOSE BLOOD TEST: CPT

## 2025-07-11 PROCEDURE — 86923 COMPATIBILITY TEST ELECTRIC: CPT

## 2025-07-11 PROCEDURE — 84100 ASSAY OF PHOSPHORUS: CPT

## 2025-07-11 PROCEDURE — 99291 CRITICAL CARE FIRST HOUR: CPT

## 2025-07-11 PROCEDURE — 83735 ASSAY OF MAGNESIUM: CPT

## 2025-07-11 PROCEDURE — 82803 BLOOD GASES ANY COMBINATION: CPT

## 2025-07-11 PROCEDURE — 31720 CLEARANCE OF AIRWAYS: CPT

## 2025-07-11 PROCEDURE — 33519 CABG ARTERY-VEIN THREE: CPT

## 2025-07-11 PROCEDURE — 85396 CLOTTING ASSAY WHOLE BLOOD: CPT

## 2025-07-11 PROCEDURE — 82330 ASSAY OF CALCIUM: CPT

## 2025-07-11 PROCEDURE — 84484 ASSAY OF TROPONIN QUANT: CPT

## 2025-07-11 PROCEDURE — 85384 FIBRINOGEN ACTIVITY: CPT

## 2025-07-11 DEVICE — CANNULA ANTEGRADE CARDIOPLEGIA 14 GA STRL: Type: IMPLANTABLE DEVICE | Status: FUNCTIONAL

## 2025-07-11 DEVICE — BIOGLUE 10ML SYR: Type: IMPLANTABLE DEVICE | Status: FUNCTIONAL

## 2025-07-11 DEVICE — LIGATING CLIPS WECK HORIZON SMALL-WIDE (RED) 24: Type: IMPLANTABLE DEVICE | Status: FUNCTIONAL

## 2025-07-11 DEVICE — LIGATING CLIPS WECK HORIZON MEDIUM (BLUE) 24: Type: IMPLANTABLE DEVICE | Status: FUNCTIONAL

## 2025-07-11 DEVICE — SURGICEL 2 X 14": Type: IMPLANTABLE DEVICE | Status: FUNCTIONAL

## 2025-07-11 DEVICE — CANNULA AORTIC PERFUSION EZ GLIDE STRAIGHT 21FR X 35CM NON-VENTED: Type: IMPLANTABLE DEVICE | Status: FUNCTIONAL

## 2025-07-11 DEVICE — SHUNT FLO-THRU INTRALUMINAL1.5MM X 18MM: Type: IMPLANTABLE DEVICE | Status: FUNCTIONAL

## 2025-07-11 DEVICE — PACING WIRE WHITE M-24 BAREWIRE 37MM X 89MM: Type: IMPLANTABLE DEVICE | Status: FUNCTIONAL

## 2025-07-11 DEVICE — KIT A-LINE 1LUM 20G X 12CM SAFE KIT: Type: IMPLANTABLE DEVICE | Status: FUNCTIONAL

## 2025-07-11 DEVICE — CANNULA RCC MANUALLY INFLATING W/GWIRE 15FR: Type: IMPLANTABLE DEVICE | Status: FUNCTIONAL

## 2025-07-11 DEVICE — ATRICLIP LAA EXCLUSION DEVICE 35MM FLEX: Type: IMPLANTABLE DEVICE | Status: FUNCTIONAL

## 2025-07-11 DEVICE — CANNULA VENOUS 2 STAGE LIGHTHOUSE TIP 32/40FR X 1/2": Type: IMPLANTABLE DEVICE | Status: FUNCTIONAL

## 2025-07-11 DEVICE — KIT CVC 1LUM 16GX20CM BLU FLX TIP: Type: IMPLANTABLE DEVICE | Status: FUNCTIONAL

## 2025-07-11 DEVICE — SHUNT FLO-THRU INTRALUMINAL 1MM X 18MM: Type: IMPLANTABLE DEVICE | Status: FUNCTIONAL

## 2025-07-11 DEVICE — SURGICEL FIBRILLAR 2 X 4": Type: IMPLANTABLE DEVICE | Status: FUNCTIONAL

## 2025-07-11 DEVICE — CANNULA VESSEL 3MM BLUNT TIP CLEAR 1-WAY VALVE: Type: IMPLANTABLE DEVICE | Status: FUNCTIONAL

## 2025-07-11 DEVICE — BONE WAX 2.5GM: Type: IMPLANTABLE DEVICE | Status: FUNCTIONAL

## 2025-07-11 RX ORDER — POLYETHYLENE GLYCOL 3350 17 G/17G
17 POWDER, FOR SOLUTION ORAL DAILY
Refills: 0 | Status: DISCONTINUED | OUTPATIENT
Start: 2025-07-12 | End: 2025-07-13

## 2025-07-11 RX ORDER — DEXTROSE 50 % IN WATER 50 %
25 SYRINGE (ML) INTRAVENOUS
Refills: 0 | Status: DISCONTINUED | OUTPATIENT
Start: 2025-07-11 | End: 2025-07-11

## 2025-07-11 RX ORDER — ALBUMIN (HUMAN) 12.5 G/50ML
250 INJECTION, SOLUTION INTRAVENOUS ONCE
Refills: 0 | Status: COMPLETED | OUTPATIENT
Start: 2025-07-11 | End: 2025-07-11

## 2025-07-11 RX ORDER — ASPIRIN 325 MG
1 TABLET ORAL
Refills: 0 | DISCHARGE

## 2025-07-11 RX ORDER — ATORVASTATIN CALCIUM 80 MG/1
80 TABLET, FILM COATED ORAL AT BEDTIME
Refills: 0 | Status: DISCONTINUED | OUTPATIENT
Start: 2025-07-11 | End: 2025-07-22

## 2025-07-11 RX ORDER — ACETAMINOPHEN 500 MG/5ML
1000 LIQUID (ML) ORAL ONCE
Refills: 0 | Status: COMPLETED | OUTPATIENT
Start: 2025-07-11 | End: 2025-07-11

## 2025-07-11 RX ORDER — FLUCONAZOLE 150 MG
200 TABLET ORAL EVERY 24 HOURS
Refills: 0 | Status: DISCONTINUED | OUTPATIENT
Start: 2025-07-12 | End: 2025-07-21

## 2025-07-11 RX ORDER — HYDROMORPHONE/SOD CHLOR,ISO/PF 2 MG/10 ML
0.5 SYRINGE (ML) INJECTION EVERY 6 HOURS
Refills: 0 | Status: DISCONTINUED | OUTPATIENT
Start: 2025-07-11 | End: 2025-07-13

## 2025-07-11 RX ORDER — GABAPENTIN 400 MG/1
300 CAPSULE ORAL ONCE
Refills: 0 | Status: COMPLETED | OUTPATIENT
Start: 2025-07-11 | End: 2025-07-11

## 2025-07-11 RX ORDER — DEXTROSE 50 % IN WATER 50 %
50 SYRINGE (ML) INTRAVENOUS
Refills: 0 | Status: DISCONTINUED | OUTPATIENT
Start: 2025-07-11 | End: 2025-07-16

## 2025-07-11 RX ORDER — SODIUM CHLORIDE 9 G/1000ML
1000 INJECTION, SOLUTION INTRAVENOUS ONCE
Refills: 0 | Status: COMPLETED | OUTPATIENT
Start: 2025-07-11 | End: 2025-07-11

## 2025-07-11 RX ORDER — GABAPENTIN 400 MG/1
100 CAPSULE ORAL EVERY 8 HOURS
Refills: 0 | Status: COMPLETED | OUTPATIENT
Start: 2025-07-11 | End: 2025-07-16

## 2025-07-11 RX ORDER — CEFUROXIME SODIUM 1.5 G
1500 VIAL (EA) INJECTION EVERY 8 HOURS
Refills: 0 | Status: COMPLETED | OUTPATIENT
Start: 2025-07-11 | End: 2025-07-13

## 2025-07-11 RX ORDER — MILRINONE LACTATE 1 MG/ML
0.2 INJECTION, SOLUTION INTRAVENOUS
Qty: 20 | Refills: 0 | Status: DISCONTINUED | OUTPATIENT
Start: 2025-07-11 | End: 2025-07-12

## 2025-07-11 RX ORDER — CALCIUM GLUCONATE 20 MG/ML
2 INJECTION, SOLUTION INTRAVENOUS ONCE
Refills: 0 | Status: COMPLETED | OUTPATIENT
Start: 2025-07-11 | End: 2025-07-11

## 2025-07-11 RX ORDER — BISACODYL 5 MG
10 TABLET, DELAYED RELEASE (ENTERIC COATED) ORAL ONCE
Refills: 0 | Status: DISCONTINUED | OUTPATIENT
Start: 2025-07-13 | End: 2025-07-22

## 2025-07-11 RX ORDER — OXYCODONE HYDROCHLORIDE 30 MG/1
5 TABLET ORAL EVERY 4 HOURS
Refills: 0 | Status: DISCONTINUED | OUTPATIENT
Start: 2025-07-11 | End: 2025-07-12

## 2025-07-11 RX ORDER — OXYCODONE HYDROCHLORIDE 30 MG/1
10 TABLET ORAL EVERY 4 HOURS
Refills: 0 | Status: DISCONTINUED | OUTPATIENT
Start: 2025-07-11 | End: 2025-07-13

## 2025-07-11 RX ORDER — LIDOCAINE HCL/PF 10 MG/ML
0.2 VIAL (ML) INJECTION ONCE
Refills: 0 | Status: DISCONTINUED | OUTPATIENT
Start: 2025-07-11 | End: 2025-07-11

## 2025-07-11 RX ORDER — NOREPINEPHRINE BITARTRATE 8 MG
0.05 SOLUTION INTRAVENOUS
Qty: 8 | Refills: 0 | Status: DISCONTINUED | OUTPATIENT
Start: 2025-07-11 | End: 2025-07-12

## 2025-07-11 RX ORDER — ACETAMINOPHEN 500 MG/5ML
650 LIQUID (ML) ORAL EVERY 6 HOURS
Refills: 0 | Status: DISCONTINUED | OUTPATIENT
Start: 2025-07-14 | End: 2025-07-22

## 2025-07-11 RX ORDER — FLUCONAZOLE 150 MG
400 TABLET ORAL ONCE
Refills: 0 | Status: COMPLETED | OUTPATIENT
Start: 2025-07-11 | End: 2025-07-11

## 2025-07-11 RX ORDER — SODIUM CHLORIDE 9 G/1000ML
500 INJECTION, SOLUTION INTRAVENOUS ONCE
Refills: 0 | Status: COMPLETED | OUTPATIENT
Start: 2025-07-11 | End: 2025-07-11

## 2025-07-11 RX ORDER — NICARDIPINE HCL 30 MG
5 CAPSULE ORAL
Qty: 40 | Refills: 0 | Status: DISCONTINUED | OUTPATIENT
Start: 2025-07-11 | End: 2025-07-12

## 2025-07-11 RX ORDER — SENNA 187 MG
2 TABLET ORAL AT BEDTIME
Refills: 0 | Status: DISCONTINUED | OUTPATIENT
Start: 2025-07-12 | End: 2025-07-16

## 2025-07-11 RX ORDER — METOCLOPRAMIDE HCL 10 MG
5 TABLET ORAL EVERY 8 HOURS
Refills: 0 | Status: COMPLETED | OUTPATIENT
Start: 2025-07-11 | End: 2025-07-12

## 2025-07-11 RX ORDER — AMIODARONE HYDROCHLORIDE 50 MG/ML
400 INJECTION, SOLUTION INTRAVENOUS
Refills: 0 | Status: DISCONTINUED | OUTPATIENT
Start: 2025-07-11 | End: 2025-07-12

## 2025-07-11 RX ORDER — ACETAMINOPHEN 500 MG/5ML
650 LIQUID (ML) ORAL EVERY 6 HOURS
Refills: 0 | Status: COMPLETED | OUTPATIENT
Start: 2025-07-11 | End: 2025-07-14

## 2025-07-11 RX ORDER — DEXMEDETOMIDINE HYDROCHLORIDE IN SODIUM CHLORIDE 4 UG/ML
0.2 INJECTION INTRAVENOUS
Qty: 200 | Refills: 0 | Status: DISCONTINUED | OUTPATIENT
Start: 2025-07-11 | End: 2025-07-12

## 2025-07-11 RX ADMIN — Medication 0.5 MILLIGRAM(S): at 18:00

## 2025-07-11 RX ADMIN — Medication 100 MILLIEQUIVALENT(S): at 17:19

## 2025-07-11 RX ADMIN — CALCIUM GLUCONATE 200 GRAM(S): 20 INJECTION, SOLUTION INTRAVENOUS at 16:00

## 2025-07-11 RX ADMIN — Medication 25 MG/HR: at 19:18

## 2025-07-11 RX ADMIN — Medication 1000 MILLIGRAM(S): at 07:09

## 2025-07-11 RX ADMIN — Medication 100 MILLIEQUIVALENT(S): at 19:16

## 2025-07-11 RX ADMIN — Medication 0.5 MILLIGRAM(S): at 18:15

## 2025-07-11 RX ADMIN — SODIUM CHLORIDE 1000 MILLILITER(S): 9 INJECTION, SOLUTION INTRAVENOUS at 16:06

## 2025-07-11 RX ADMIN — MILRINONE LACTATE 10.4 MICROGRAM(S)/KG/MIN: 1 INJECTION, SOLUTION INTRAVENOUS at 19:19

## 2025-07-11 RX ADMIN — SODIUM CHLORIDE 500 MILLILITER(S): 9 INJECTION, SOLUTION INTRAVENOUS at 17:20

## 2025-07-11 RX ADMIN — Medication 100 MILLIGRAM(S): at 20:20

## 2025-07-11 RX ADMIN — MILRINONE LACTATE 10.4 MICROGRAM(S)/KG/MIN: 1 INJECTION, SOLUTION INTRAVENOUS at 16:08

## 2025-07-11 RX ADMIN — Medication 25 MG/HR: at 17:19

## 2025-07-11 RX ADMIN — Medication 400 MILLIGRAM(S): at 20:00

## 2025-07-11 RX ADMIN — Medication 1000 MILLIGRAM(S): at 20:15

## 2025-07-11 RX ADMIN — ALBUMIN (HUMAN) 125 MILLILITER(S): 12.5 INJECTION, SOLUTION INTRAVENOUS at 19:45

## 2025-07-11 RX ADMIN — GABAPENTIN 300 MILLIGRAM(S): 400 CAPSULE ORAL at 07:09

## 2025-07-11 RX ADMIN — Medication 50 MILLIEQUIVALENT(S): at 16:51

## 2025-07-11 RX ADMIN — Medication 3 MILLILITER(S): at 07:00

## 2025-07-11 RX ADMIN — Medication 15 MILLILITER(S): at 18:25

## 2025-07-11 RX ADMIN — Medication 100 MILLIEQUIVALENT(S): at 17:31

## 2025-07-11 RX ADMIN — Medication 5 MILLIGRAM(S): at 22:28

## 2025-07-11 RX ADMIN — Medication 50 MILLIEQUIVALENT(S): at 16:05

## 2025-07-11 RX ADMIN — DEXMEDETOMIDINE HYDROCHLORIDE IN SODIUM CHLORIDE 4.63 MICROGRAM(S)/KG/HR: 4 INJECTION INTRAVENOUS at 19:18

## 2025-07-11 RX ADMIN — Medication 100 MILLIEQUIVALENT(S): at 18:47

## 2025-07-12 LAB
ALBUMIN SERPL ELPH-MCNC: 2.9 G/DL — LOW (ref 3.3–5)
ALP SERPL-CCNC: 30 U/L — LOW (ref 40–120)
ALT FLD-CCNC: 13 U/L — SIGNIFICANT CHANGE UP (ref 10–45)
ANION GAP SERPL CALC-SCNC: 12 MMOL/L — SIGNIFICANT CHANGE UP (ref 5–17)
APTT BLD: 30.4 SEC — SIGNIFICANT CHANGE UP (ref 26.1–36.8)
AST SERPL-CCNC: 50 U/L — HIGH (ref 10–40)
BASE EXCESS BLDV CALC-SCNC: -0.9 MMOL/L — SIGNIFICANT CHANGE UP (ref -2–3)
BASE EXCESS BLDV CALC-SCNC: -2.2 MMOL/L — LOW (ref -2–3)
BASE EXCESS BLDV CALC-SCNC: -3.2 MMOL/L — LOW (ref -2–3)
BASE EXCESS BLDV CALC-SCNC: -3.3 MMOL/L — LOW (ref -2–3)
BASE EXCESS BLDV CALC-SCNC: -3.8 MMOL/L — LOW (ref -2–3)
BASOPHILS # BLD AUTO: 0.07 K/UL — SIGNIFICANT CHANGE UP (ref 0–0.2)
BASOPHILS NFR BLD AUTO: 0.7 % — SIGNIFICANT CHANGE UP (ref 0–2)
BILIRUB SERPL-MCNC: 3.5 MG/DL — HIGH (ref 0.2–1.2)
BUN SERPL-MCNC: 19 MG/DL — SIGNIFICANT CHANGE UP (ref 7–23)
CALCIUM SERPL-MCNC: 8.4 MG/DL — SIGNIFICANT CHANGE UP (ref 8.4–10.5)
CHLORIDE SERPL-SCNC: 110 MMOL/L — HIGH (ref 96–108)
CO2 BLDV-SCNC: 23 MMOL/L — SIGNIFICANT CHANGE UP (ref 22–26)
CO2 BLDV-SCNC: 23 MMOL/L — SIGNIFICANT CHANGE UP (ref 22–26)
CO2 BLDV-SCNC: 24 MMOL/L — SIGNIFICANT CHANGE UP (ref 22–26)
CO2 BLDV-SCNC: 25 MMOL/L — SIGNIFICANT CHANGE UP (ref 22–26)
CO2 BLDV-SCNC: 27 MMOL/L — HIGH (ref 22–26)
CO2 SERPL-SCNC: 21 MMOL/L — LOW (ref 22–31)
CREAT SERPL-MCNC: 1.55 MG/DL — HIGH (ref 0.5–1.3)
EGFR: 48 ML/MIN/1.73M2 — LOW
EGFR: 48 ML/MIN/1.73M2 — LOW
EOSINOPHIL # BLD AUTO: 0.09 K/UL — SIGNIFICANT CHANGE UP (ref 0–0.5)
EOSINOPHIL NFR BLD AUTO: 0.9 % — SIGNIFICANT CHANGE UP (ref 0–6)
FIBRINOGEN PPP-MCNC: 193 MG/DL — LOW (ref 200–445)
GAS PNL BLDA: SIGNIFICANT CHANGE UP
GAS PNL BLDV: SIGNIFICANT CHANGE UP
GLUCOSE SERPL-MCNC: 123 MG/DL — HIGH (ref 70–99)
HCO3 BLDV-SCNC: 22 MMOL/L — SIGNIFICANT CHANGE UP (ref 22–29)
HCO3 BLDV-SCNC: 22 MMOL/L — SIGNIFICANT CHANGE UP (ref 22–29)
HCO3 BLDV-SCNC: 23 MMOL/L — SIGNIFICANT CHANGE UP (ref 22–29)
HCO3 BLDV-SCNC: 24 MMOL/L — SIGNIFICANT CHANGE UP (ref 22–29)
HCO3 BLDV-SCNC: 26 MMOL/L — SIGNIFICANT CHANGE UP (ref 22–29)
HCT VFR BLD CALC: 27.4 % — LOW (ref 39–50)
HGB BLD-MCNC: 9.3 G/DL — LOW (ref 13–17)
HOROWITZ INDEX BLDV+IHG-RTO: 40 — SIGNIFICANT CHANGE UP
IMM GRANULOCYTES # BLD AUTO: 0.05 K/UL — SIGNIFICANT CHANGE UP (ref 0–0.07)
IMM GRANULOCYTES NFR BLD AUTO: 0.5 % — SIGNIFICANT CHANGE UP (ref 0–0.9)
INR BLD: 1.4 RATIO — HIGH (ref 0.85–1.16)
LYMPHOCYTES # BLD AUTO: 0.85 K/UL — LOW (ref 1–3.3)
LYMPHOCYTES NFR BLD AUTO: 8.3 % — LOW (ref 13–44)
MAGNESIUM SERPL-MCNC: 2.7 MG/DL — HIGH (ref 1.6–2.6)
MCHC RBC-ENTMCNC: 29.7 PG — SIGNIFICANT CHANGE UP (ref 27–34)
MCHC RBC-ENTMCNC: 33.9 G/DL — SIGNIFICANT CHANGE UP (ref 32–36)
MCV RBC AUTO: 87.5 FL — SIGNIFICANT CHANGE UP (ref 80–100)
MONOCYTES # BLD AUTO: 0.96 K/UL — HIGH (ref 0–0.9)
MONOCYTES NFR BLD AUTO: 9.4 % — SIGNIFICANT CHANGE UP (ref 2–14)
NEUTROPHILS # BLD AUTO: 8.21 K/UL — HIGH (ref 1.8–7.4)
NEUTROPHILS NFR BLD AUTO: 80.2 % — HIGH (ref 43–77)
NRBC # BLD AUTO: 0 K/UL — SIGNIFICANT CHANGE UP (ref 0–0)
NRBC # FLD: 0 K/UL — SIGNIFICANT CHANGE UP (ref 0–0)
NRBC BLD AUTO-RTO: 0 /100 WBCS — SIGNIFICANT CHANGE UP (ref 0–0)
PCO2 BLDV: 39 MMHG — LOW (ref 42–55)
PCO2 BLDV: 40 MMHG — LOW (ref 42–55)
PCO2 BLDV: 41 MMHG — LOW (ref 42–55)
PCO2 BLDV: 48 MMHG — SIGNIFICANT CHANGE UP (ref 42–55)
PCO2 BLDV: 50 MMHG — SIGNIFICANT CHANGE UP (ref 42–55)
PH BLDV: 7.3 — LOW (ref 7.32–7.43)
PH BLDV: 7.32 — SIGNIFICANT CHANGE UP (ref 7.32–7.43)
PH BLDV: 7.35 — SIGNIFICANT CHANGE UP (ref 7.32–7.43)
PH BLDV: 7.35 — SIGNIFICANT CHANGE UP (ref 7.32–7.43)
PH BLDV: 7.36 — SIGNIFICANT CHANGE UP (ref 7.32–7.43)
PHOSPHATE SERPL-MCNC: 2.5 MG/DL — SIGNIFICANT CHANGE UP (ref 2.5–4.5)
PLATELET # BLD AUTO: 168 K/UL — SIGNIFICANT CHANGE UP (ref 150–400)
PMV BLD: 10.7 FL — SIGNIFICANT CHANGE UP (ref 7–13)
PO2 BLDV: 41 MMHG — SIGNIFICANT CHANGE UP (ref 25–45)
PO2 BLDV: 42 MMHG — SIGNIFICANT CHANGE UP (ref 25–45)
PO2 BLDV: 44 MMHG — SIGNIFICANT CHANGE UP (ref 25–45)
PO2 BLDV: 47 MMHG — HIGH (ref 25–45)
PO2 BLDV: 50 MMHG — HIGH (ref 25–45)
POTASSIUM BLDA-SCNC: 3.9 MMOL/L — SIGNIFICANT CHANGE UP (ref 3.5–5.1)
POTASSIUM SERPL-MCNC: 4.7 MMOL/L — SIGNIFICANT CHANGE UP (ref 3.5–5.3)
POTASSIUM SERPL-SCNC: 4.7 MMOL/L — SIGNIFICANT CHANGE UP (ref 3.5–5.3)
PROT SERPL-MCNC: 4.1 G/DL — LOW (ref 6–8.3)
PROTHROM AB SERPL-ACNC: 16 SEC — HIGH (ref 9.9–13.4)
RBC # BLD: 3.13 M/UL — LOW (ref 4.2–5.8)
RBC # FLD: 14.2 % — SIGNIFICANT CHANGE UP (ref 10.3–14.5)
SAO2 % BLDV: 69.2 % — SIGNIFICANT CHANGE UP (ref 67–88)
SAO2 % BLDV: 72.1 % — SIGNIFICANT CHANGE UP (ref 67–88)
SAO2 % BLDV: 76.1 % — SIGNIFICANT CHANGE UP (ref 67–88)
SAO2 % BLDV: 79.6 % — SIGNIFICANT CHANGE UP (ref 67–88)
SAO2 % BLDV: 85.9 % — SIGNIFICANT CHANGE UP (ref 67–88)
SODIUM SERPL-SCNC: 143 MMOL/L — SIGNIFICANT CHANGE UP (ref 135–145)
T3 SERPL-MCNC: 65 NG/DL — LOW (ref 80–200)
T4 AB SER-ACNC: 4.8 UG/DL — SIGNIFICANT CHANGE UP (ref 4.6–12)
T4 FREE SERPL-MCNC: 1.4 NG/DL — SIGNIFICANT CHANGE UP (ref 0.9–1.8)
TSH SERPL-MCNC: 2.56 UIU/ML — SIGNIFICANT CHANGE UP (ref 0.27–4.2)
WBC # BLD: 10.23 K/UL — SIGNIFICANT CHANGE UP (ref 3.8–10.5)
WBC # FLD AUTO: 10.23 K/UL — SIGNIFICANT CHANGE UP (ref 3.8–10.5)

## 2025-07-12 PROCEDURE — 71045 X-RAY EXAM CHEST 1 VIEW: CPT | Mod: 26

## 2025-07-12 PROCEDURE — 99291 CRITICAL CARE FIRST HOUR: CPT

## 2025-07-12 RX ORDER — METOPROLOL SUCCINATE 50 MG/1
25 TABLET, EXTENDED RELEASE ORAL EVERY 12 HOURS
Refills: 0 | Status: DISCONTINUED | OUTPATIENT
Start: 2025-07-12 | End: 2025-07-13

## 2025-07-12 RX ORDER — INSULIN LISPRO 100 U/ML
INJECTION, SOLUTION INTRAVENOUS; SUBCUTANEOUS
Refills: 0 | Status: DISCONTINUED | OUTPATIENT
Start: 2025-07-12 | End: 2025-07-14

## 2025-07-12 RX ORDER — SODIUM PHOSPHATE,DIBASIC DIHYD
15 POWDER (GRAM) MISCELLANEOUS ONCE
Refills: 0 | Status: COMPLETED | OUTPATIENT
Start: 2025-07-12 | End: 2025-07-12

## 2025-07-12 RX ORDER — DOBUTAMINE 250 MG/20ML
1.5 INJECTION INTRAVENOUS
Qty: 500 | Refills: 0 | Status: DISCONTINUED | OUTPATIENT
Start: 2025-07-12 | End: 2025-07-12

## 2025-07-12 RX ORDER — NICARDIPINE HCL 30 MG
5 CAPSULE ORAL
Qty: 40 | Refills: 0 | Status: DISCONTINUED | OUTPATIENT
Start: 2025-07-12 | End: 2025-07-13

## 2025-07-12 RX ORDER — FUROSEMIDE 10 MG/ML
20 INJECTION INTRAMUSCULAR; INTRAVENOUS ONCE
Refills: 0 | Status: COMPLETED | OUTPATIENT
Start: 2025-07-12 | End: 2025-07-12

## 2025-07-12 RX ORDER — INSULIN LISPRO 100 U/ML
INJECTION, SOLUTION INTRAVENOUS; SUBCUTANEOUS AT BEDTIME
Refills: 0 | Status: DISCONTINUED | OUTPATIENT
Start: 2025-07-12 | End: 2025-07-14

## 2025-07-12 RX ORDER — ASPIRIN 325 MG
81 TABLET ORAL DAILY
Refills: 0 | Status: DISCONTINUED | OUTPATIENT
Start: 2025-07-12 | End: 2025-07-22

## 2025-07-12 RX ORDER — AMLODIPINE BESYLATE 10 MG/1
10 TABLET ORAL DAILY
Refills: 0 | Status: DISCONTINUED | OUTPATIENT
Start: 2025-07-12 | End: 2025-07-22

## 2025-07-12 RX ORDER — HEPARIN SODIUM 1000 [USP'U]/ML
5000 INJECTION INTRAVENOUS; SUBCUTANEOUS EVERY 8 HOURS
Refills: 0 | Status: DISCONTINUED | OUTPATIENT
Start: 2025-07-13 | End: 2025-07-22

## 2025-07-12 RX ADMIN — METOPROLOL SUCCINATE 25 MILLIGRAM(S): 50 TABLET, EXTENDED RELEASE ORAL at 19:48

## 2025-07-12 RX ADMIN — Medication 100 MILLIGRAM(S): at 19:48

## 2025-07-12 RX ADMIN — Medication 50 MILLIEQUIVALENT(S): at 18:31

## 2025-07-12 RX ADMIN — MILRINONE LACTATE 5.55 MICROGRAM(S)/KG/MIN: 1 INJECTION, SOLUTION INTRAVENOUS at 01:39

## 2025-07-12 RX ADMIN — Medication 650 MILLIGRAM(S): at 18:02

## 2025-07-12 RX ADMIN — Medication 650 MILLIGRAM(S): at 17:32

## 2025-07-12 RX ADMIN — Medication 500 MILLIGRAM(S): at 17:32

## 2025-07-12 RX ADMIN — NOREPINEPHRINE BITARTRATE 8.67 MICROGRAM(S)/KG/MIN: 8 SOLUTION at 01:39

## 2025-07-12 RX ADMIN — Medication 650 MILLIGRAM(S): at 13:02

## 2025-07-12 RX ADMIN — GABAPENTIN 100 MILLIGRAM(S): 400 CAPSULE ORAL at 09:02

## 2025-07-12 RX ADMIN — Medication 25 MG/HR: at 19:49

## 2025-07-12 RX ADMIN — GABAPENTIN 100 MILLIGRAM(S): 400 CAPSULE ORAL at 13:56

## 2025-07-12 RX ADMIN — METOPROLOL SUCCINATE 25 MILLIGRAM(S): 50 TABLET, EXTENDED RELEASE ORAL at 12:40

## 2025-07-12 RX ADMIN — Medication 25 MG/HR: at 09:01

## 2025-07-12 RX ADMIN — Medication 81 MILLIGRAM(S): at 12:32

## 2025-07-12 RX ADMIN — OXYCODONE HYDROCHLORIDE 5 MILLIGRAM(S): 30 TABLET ORAL at 18:42

## 2025-07-12 RX ADMIN — OXYCODONE HYDROCHLORIDE 10 MILLIGRAM(S): 30 TABLET ORAL at 13:07

## 2025-07-12 RX ADMIN — Medication 5 MILLIGRAM(S): at 13:55

## 2025-07-12 RX ADMIN — Medication 2 TABLET(S): at 21:10

## 2025-07-12 RX ADMIN — Medication 500 MILLIGRAM(S): at 09:02

## 2025-07-12 RX ADMIN — Medication 0.5 MILLIGRAM(S): at 09:16

## 2025-07-12 RX ADMIN — FUROSEMIDE 20 MILLIGRAM(S): 10 INJECTION INTRAMUSCULAR; INTRAVENOUS at 02:37

## 2025-07-12 RX ADMIN — Medication 100 MILLIGRAM(S): at 13:56

## 2025-07-12 RX ADMIN — OXYCODONE HYDROCHLORIDE 5 MILLIGRAM(S): 30 TABLET ORAL at 19:12

## 2025-07-12 RX ADMIN — Medication 1 APPLICATION(S): at 11:56

## 2025-07-12 RX ADMIN — Medication 10 MILLILITER(S): at 19:49

## 2025-07-12 RX ADMIN — Medication 5 MILLIGRAM(S): at 06:42

## 2025-07-12 RX ADMIN — Medication 0.5 MILLIGRAM(S): at 09:01

## 2025-07-12 RX ADMIN — AMIODARONE HYDROCHLORIDE 400 MILLIGRAM(S): 50 INJECTION, SOLUTION INTRAVENOUS at 06:45

## 2025-07-12 RX ADMIN — Medication 63.75 MILLIMOLE(S): at 01:38

## 2025-07-12 RX ADMIN — GABAPENTIN 100 MILLIGRAM(S): 400 CAPSULE ORAL at 21:10

## 2025-07-12 RX ADMIN — AMIODARONE HYDROCHLORIDE 400 MILLIGRAM(S): 50 INJECTION, SOLUTION INTRAVENOUS at 17:32

## 2025-07-12 RX ADMIN — Medication 40 MILLIGRAM(S): at 12:32

## 2025-07-12 RX ADMIN — Medication 100 MILLIGRAM(S): at 04:53

## 2025-07-12 RX ADMIN — OXYCODONE HYDROCHLORIDE 10 MILLIGRAM(S): 30 TABLET ORAL at 12:37

## 2025-07-12 RX ADMIN — Medication 100 MILLIGRAM(S): at 12:34

## 2025-07-12 RX ADMIN — Medication 650 MILLIGRAM(S): at 12:32

## 2025-07-12 RX ADMIN — ATORVASTATIN CALCIUM 80 MILLIGRAM(S): 80 TABLET, FILM COATED ORAL at 21:10

## 2025-07-12 RX ADMIN — POLYETHYLENE GLYCOL 3350 17 GRAM(S): 17 POWDER, FOR SOLUTION ORAL at 12:34

## 2025-07-13 LAB
ALBUMIN SERPL ELPH-MCNC: 3.1 G/DL — LOW (ref 3.3–5)
ALP SERPL-CCNC: 37 U/L — LOW (ref 40–120)
ALT FLD-CCNC: 18 U/L — SIGNIFICANT CHANGE UP (ref 10–45)
ANION GAP SERPL CALC-SCNC: 11 MMOL/L — SIGNIFICANT CHANGE UP (ref 5–17)
AST SERPL-CCNC: 54 U/L — HIGH (ref 10–40)
BASOPHILS # BLD AUTO: 0.05 K/UL — SIGNIFICANT CHANGE UP (ref 0–0.2)
BASOPHILS NFR BLD AUTO: 0.3 % — SIGNIFICANT CHANGE UP (ref 0–2)
BILIRUB SERPL-MCNC: 1.1 MG/DL — SIGNIFICANT CHANGE UP (ref 0.2–1.2)
BLD GP AB SCN SERPL QL: NEGATIVE — SIGNIFICANT CHANGE UP
BUN SERPL-MCNC: 19 MG/DL — SIGNIFICANT CHANGE UP (ref 7–23)
CALCIUM SERPL-MCNC: 8.1 MG/DL — LOW (ref 8.4–10.5)
CHLORIDE SERPL-SCNC: 111 MMOL/L — HIGH (ref 96–108)
CO2 SERPL-SCNC: 21 MMOL/L — LOW (ref 22–31)
CREAT SERPL-MCNC: 1.3 MG/DL — SIGNIFICANT CHANGE UP (ref 0.5–1.3)
EGFR: 59 ML/MIN/1.73M2 — LOW
EGFR: 59 ML/MIN/1.73M2 — LOW
EOSINOPHIL # BLD AUTO: 0.15 K/UL — SIGNIFICANT CHANGE UP (ref 0–0.5)
EOSINOPHIL NFR BLD AUTO: 1 % — SIGNIFICANT CHANGE UP (ref 0–6)
GAS PNL BLDA: SIGNIFICANT CHANGE UP
GAS PNL BLDA: SIGNIFICANT CHANGE UP
GAS PNL BLDV: SIGNIFICANT CHANGE UP
GLUCOSE SERPL-MCNC: 132 MG/DL — HIGH (ref 70–99)
HCT VFR BLD CALC: 28.6 % — LOW (ref 39–50)
HGB BLD-MCNC: 9.7 G/DL — LOW (ref 13–17)
IMM GRANULOCYTES # BLD AUTO: 0.08 K/UL — HIGH (ref 0–0.07)
IMM GRANULOCYTES NFR BLD AUTO: 0.5 % — SIGNIFICANT CHANGE UP (ref 0–0.9)
LYMPHOCYTES # BLD AUTO: 1.42 K/UL — SIGNIFICANT CHANGE UP (ref 1–3.3)
LYMPHOCYTES NFR BLD AUTO: 9.7 % — LOW (ref 13–44)
MAGNESIUM SERPL-MCNC: 2.6 MG/DL — SIGNIFICANT CHANGE UP (ref 1.6–2.6)
MCHC RBC-ENTMCNC: 30.5 PG — SIGNIFICANT CHANGE UP (ref 27–34)
MCHC RBC-ENTMCNC: 33.9 G/DL — SIGNIFICANT CHANGE UP (ref 32–36)
MCV RBC AUTO: 89.9 FL — SIGNIFICANT CHANGE UP (ref 80–100)
MONOCYTES # BLD AUTO: 1.31 K/UL — HIGH (ref 0–0.9)
MONOCYTES NFR BLD AUTO: 9 % — SIGNIFICANT CHANGE UP (ref 2–14)
NEUTROPHILS # BLD AUTO: 11.56 K/UL — HIGH (ref 1.8–7.4)
NEUTROPHILS NFR BLD AUTO: 79.5 % — HIGH (ref 43–77)
NRBC # BLD AUTO: 0 K/UL — SIGNIFICANT CHANGE UP (ref 0–0)
NRBC # FLD: 0 K/UL — SIGNIFICANT CHANGE UP (ref 0–0)
NRBC BLD AUTO-RTO: 0 /100 WBCS — SIGNIFICANT CHANGE UP (ref 0–0)
PHOSPHATE SERPL-MCNC: 2.7 MG/DL — SIGNIFICANT CHANGE UP (ref 2.5–4.5)
PLATELET # BLD AUTO: 162 K/UL — SIGNIFICANT CHANGE UP (ref 150–400)
PMV BLD: 10.8 FL — SIGNIFICANT CHANGE UP (ref 7–13)
POTASSIUM BLDA-SCNC: 3.6 MMOL/L — SIGNIFICANT CHANGE UP (ref 3.5–5.1)
POTASSIUM SERPL-MCNC: 3.6 MMOL/L — SIGNIFICANT CHANGE UP (ref 3.5–5.3)
POTASSIUM SERPL-SCNC: 3.6 MMOL/L — SIGNIFICANT CHANGE UP (ref 3.5–5.3)
PROT SERPL-MCNC: 4.9 G/DL — LOW (ref 6–8.3)
RBC # BLD: 3.18 M/UL — LOW (ref 4.2–5.8)
RBC # FLD: 14.6 % — HIGH (ref 10.3–14.5)
RH IG SCN BLD-IMP: POSITIVE — SIGNIFICANT CHANGE UP
SODIUM SERPL-SCNC: 143 MMOL/L — SIGNIFICANT CHANGE UP (ref 135–145)
WBC # BLD: 14.57 K/UL — HIGH (ref 3.8–10.5)
WBC # FLD AUTO: 14.57 K/UL — HIGH (ref 3.8–10.5)

## 2025-07-13 PROCEDURE — 71045 X-RAY EXAM CHEST 1 VIEW: CPT | Mod: 26

## 2025-07-13 RX ORDER — TORSEMIDE 10 MG
20 TABLET ORAL EVERY 12 HOURS
Refills: 0 | Status: DISCONTINUED | OUTPATIENT
Start: 2025-07-13 | End: 2025-07-15

## 2025-07-13 RX ORDER — AMLODIPINE BESYLATE 10 MG/1
10 TABLET ORAL ONCE
Refills: 0 | Status: COMPLETED | OUTPATIENT
Start: 2025-07-13 | End: 2025-07-13

## 2025-07-13 RX ORDER — SPIRONOLACTONE 25 MG
50 TABLET ORAL ONCE
Refills: 0 | Status: COMPLETED | OUTPATIENT
Start: 2025-07-13 | End: 2025-07-13

## 2025-07-13 RX ORDER — SPIRONOLACTONE 25 MG
50 TABLET ORAL
Refills: 0 | Status: DISCONTINUED | OUTPATIENT
Start: 2025-07-13 | End: 2025-07-22

## 2025-07-13 RX ORDER — POLYETHYLENE GLYCOL 3350 17 G/17G
17 POWDER, FOR SOLUTION ORAL EVERY 12 HOURS
Refills: 0 | Status: DISCONTINUED | OUTPATIENT
Start: 2025-07-13 | End: 2025-07-16

## 2025-07-13 RX ORDER — METOPROLOL SUCCINATE 50 MG/1
25 TABLET, EXTENDED RELEASE ORAL EVERY 8 HOURS
Refills: 0 | Status: DISCONTINUED | OUTPATIENT
Start: 2025-07-13 | End: 2025-07-14

## 2025-07-13 RX ADMIN — HEPARIN SODIUM 5000 UNIT(S): 1000 INJECTION INTRAVENOUS; SUBCUTANEOUS at 21:42

## 2025-07-13 RX ADMIN — Medication 20 MILLIGRAM(S): at 08:02

## 2025-07-13 RX ADMIN — Medication 50 MILLIEQUIVALENT(S): at 08:44

## 2025-07-13 RX ADMIN — HEPARIN SODIUM 5000 UNIT(S): 1000 INJECTION INTRAVENOUS; SUBCUTANEOUS at 13:10

## 2025-07-13 RX ADMIN — Medication 650 MILLIGRAM(S): at 05:27

## 2025-07-13 RX ADMIN — Medication 650 MILLIGRAM(S): at 11:00

## 2025-07-13 RX ADMIN — Medication 650 MILLIGRAM(S): at 23:42

## 2025-07-13 RX ADMIN — AMLODIPINE BESYLATE 10 MILLIGRAM(S): 10 TABLET ORAL at 08:45

## 2025-07-13 RX ADMIN — Medication 2 TABLET(S): at 21:43

## 2025-07-13 RX ADMIN — Medication 0.5 MILLIGRAM(S): at 10:45

## 2025-07-13 RX ADMIN — Medication 10 MILLIGRAM(S): at 12:30

## 2025-07-13 RX ADMIN — Medication 25 MILLIGRAM(S): at 18:07

## 2025-07-13 RX ADMIN — METOPROLOL SUCCINATE 25 MILLIGRAM(S): 50 TABLET, EXTENDED RELEASE ORAL at 05:26

## 2025-07-13 RX ADMIN — Medication 500 MILLIGRAM(S): at 18:07

## 2025-07-13 RX ADMIN — Medication 20 MILLIGRAM(S): at 18:07

## 2025-07-13 RX ADMIN — Medication 50 MILLIGRAM(S): at 13:12

## 2025-07-13 RX ADMIN — OXYCODONE HYDROCHLORIDE 10 MILLIGRAM(S): 30 TABLET ORAL at 16:30

## 2025-07-13 RX ADMIN — Medication 50 MILLIEQUIVALENT(S): at 09:53

## 2025-07-13 RX ADMIN — Medication 40 MILLIGRAM(S): at 07:01

## 2025-07-13 RX ADMIN — GABAPENTIN 100 MILLIGRAM(S): 400 CAPSULE ORAL at 13:10

## 2025-07-13 RX ADMIN — Medication 10 MILLIGRAM(S): at 09:52

## 2025-07-13 RX ADMIN — Medication 100 MILLIGRAM(S): at 13:10

## 2025-07-13 RX ADMIN — Medication 50 MILLIGRAM(S): at 08:03

## 2025-07-13 RX ADMIN — Medication 1 APPLICATION(S): at 12:02

## 2025-07-13 RX ADMIN — GABAPENTIN 100 MILLIGRAM(S): 400 CAPSULE ORAL at 21:42

## 2025-07-13 RX ADMIN — Medication 25 MILLIGRAM(S): at 10:58

## 2025-07-13 RX ADMIN — Medication 10 MILLIGRAM(S): at 23:50

## 2025-07-13 RX ADMIN — Medication 81 MILLIGRAM(S): at 11:00

## 2025-07-13 RX ADMIN — ATORVASTATIN CALCIUM 80 MILLIGRAM(S): 80 TABLET, FILM COATED ORAL at 21:44

## 2025-07-13 RX ADMIN — Medication 100 MILLIGRAM(S): at 04:00

## 2025-07-13 RX ADMIN — Medication 500 MILLIGRAM(S): at 05:27

## 2025-07-13 RX ADMIN — METOPROLOL SUCCINATE 25 MILLIGRAM(S): 50 TABLET, EXTENDED RELEASE ORAL at 13:40

## 2025-07-13 RX ADMIN — Medication 0.5 MILLIGRAM(S): at 11:00

## 2025-07-13 RX ADMIN — Medication 50 MILLIGRAM(S): at 21:49

## 2025-07-13 RX ADMIN — Medication 650 MILLIGRAM(S): at 18:07

## 2025-07-13 RX ADMIN — Medication 0.1 MILLIGRAM(S): at 13:10

## 2025-07-13 RX ADMIN — Medication 50 MILLIEQUIVALENT(S): at 16:22

## 2025-07-13 RX ADMIN — POLYETHYLENE GLYCOL 3350 17 GRAM(S): 17 POWDER, FOR SOLUTION ORAL at 18:07

## 2025-07-13 RX ADMIN — METOPROLOL SUCCINATE 25 MILLIGRAM(S): 50 TABLET, EXTENDED RELEASE ORAL at 21:42

## 2025-07-13 RX ADMIN — OXYCODONE HYDROCHLORIDE 10 MILLIGRAM(S): 30 TABLET ORAL at 16:00

## 2025-07-13 RX ADMIN — HEPARIN SODIUM 5000 UNIT(S): 1000 INJECTION INTRAVENOUS; SUBCUTANEOUS at 05:27

## 2025-07-13 RX ADMIN — Medication 50 MILLIEQUIVALENT(S): at 18:08

## 2025-07-13 RX ADMIN — GABAPENTIN 100 MILLIGRAM(S): 400 CAPSULE ORAL at 05:27

## 2025-07-13 RX ADMIN — Medication 50 MILLIEQUIVALENT(S): at 17:07

## 2025-07-14 DIAGNOSIS — I10 ESSENTIAL (PRIMARY) HYPERTENSION: ICD-10-CM

## 2025-07-14 DIAGNOSIS — Z95.1 PRESENCE OF AORTOCORONARY BYPASS GRAFT: ICD-10-CM

## 2025-07-14 DIAGNOSIS — R21 RASH AND OTHER NONSPECIFIC SKIN ERUPTION: ICD-10-CM

## 2025-07-14 LAB
ADD ON TEST-SPECIMEN IN LAB: SIGNIFICANT CHANGE UP
ADD ON TEST-SPECIMEN IN LAB: SIGNIFICANT CHANGE UP
ALBUMIN SERPL ELPH-MCNC: 3.5 G/DL — SIGNIFICANT CHANGE UP (ref 3.3–5)
ALP SERPL-CCNC: 46 U/L — SIGNIFICANT CHANGE UP (ref 40–120)
ALT FLD-CCNC: 19 U/L — SIGNIFICANT CHANGE UP (ref 10–45)
ANION GAP SERPL CALC-SCNC: 12 MMOL/L — SIGNIFICANT CHANGE UP (ref 5–17)
ANION GAP SERPL CALC-SCNC: 13 MMOL/L — SIGNIFICANT CHANGE UP (ref 5–17)
AST SERPL-CCNC: 34 U/L — SIGNIFICANT CHANGE UP (ref 10–40)
BILIRUB DIRECT SERPL-MCNC: 0.4 MG/DL — HIGH (ref 0–0.3)
BILIRUB INDIRECT FLD-MCNC: 0.5 MG/DL — SIGNIFICANT CHANGE UP (ref 0.2–1)
BILIRUB SERPL-MCNC: 0.9 MG/DL — SIGNIFICANT CHANGE UP (ref 0.2–1.2)
BUN SERPL-MCNC: 21 MG/DL — SIGNIFICANT CHANGE UP (ref 7–23)
BUN SERPL-MCNC: 21 MG/DL — SIGNIFICANT CHANGE UP (ref 7–23)
CALCIUM SERPL-MCNC: 8.4 MG/DL — SIGNIFICANT CHANGE UP (ref 8.4–10.5)
CALCIUM SERPL-MCNC: 8.8 MG/DL — SIGNIFICANT CHANGE UP (ref 8.4–10.5)
CHLORIDE SERPL-SCNC: 106 MMOL/L — SIGNIFICANT CHANGE UP (ref 96–108)
CHLORIDE SERPL-SCNC: 106 MMOL/L — SIGNIFICANT CHANGE UP (ref 96–108)
CO2 SERPL-SCNC: 22 MMOL/L — SIGNIFICANT CHANGE UP (ref 22–31)
CO2 SERPL-SCNC: 23 MMOL/L — SIGNIFICANT CHANGE UP (ref 22–31)
CREAT SERPL-MCNC: 1.33 MG/DL — HIGH (ref 0.5–1.3)
CREAT SERPL-MCNC: 1.45 MG/DL — HIGH (ref 0.5–1.3)
EGFR: 52 ML/MIN/1.73M2 — LOW
EGFR: 52 ML/MIN/1.73M2 — LOW
EGFR: 57 ML/MIN/1.73M2 — LOW
EGFR: 57 ML/MIN/1.73M2 — LOW
GAS PNL BLDA: SIGNIFICANT CHANGE UP
GLUCOSE SERPL-MCNC: 104 MG/DL — HIGH (ref 70–99)
GLUCOSE SERPL-MCNC: 155 MG/DL — HIGH (ref 70–99)
HCT VFR BLD CALC: 30.9 % — LOW (ref 39–50)
HGB BLD-MCNC: 10.2 G/DL — LOW (ref 13–17)
MAGNESIUM SERPL-MCNC: 2 MG/DL — SIGNIFICANT CHANGE UP (ref 1.6–2.6)
MAGNESIUM SERPL-MCNC: 2.1 MG/DL — SIGNIFICANT CHANGE UP (ref 1.6–2.6)
MCHC RBC-ENTMCNC: 30.1 PG — SIGNIFICANT CHANGE UP (ref 27–34)
MCHC RBC-ENTMCNC: 33 G/DL — SIGNIFICANT CHANGE UP (ref 32–36)
MCV RBC AUTO: 91.2 FL — SIGNIFICANT CHANGE UP (ref 80–100)
NRBC # BLD AUTO: 0 K/UL — SIGNIFICANT CHANGE UP (ref 0–0)
NRBC # FLD: 0 K/UL — SIGNIFICANT CHANGE UP (ref 0–0)
NRBC BLD AUTO-RTO: 0 /100 WBCS — SIGNIFICANT CHANGE UP (ref 0–0)
PHOSPHATE SERPL-MCNC: 1.5 MG/DL — LOW (ref 2.5–4.5)
PHOSPHATE SERPL-MCNC: 2.2 MG/DL — LOW (ref 2.5–4.5)
PLATELET # BLD AUTO: 182 K/UL — SIGNIFICANT CHANGE UP (ref 150–400)
PMV BLD: 11.1 FL — SIGNIFICANT CHANGE UP (ref 7–13)
POTASSIUM BLDA-SCNC: 3.7 MMOL/L — SIGNIFICANT CHANGE UP (ref 3.5–5.1)
POTASSIUM SERPL-MCNC: 3.5 MMOL/L — SIGNIFICANT CHANGE UP (ref 3.5–5.3)
POTASSIUM SERPL-MCNC: 4 MMOL/L — SIGNIFICANT CHANGE UP (ref 3.5–5.3)
POTASSIUM SERPL-SCNC: 3.5 MMOL/L — SIGNIFICANT CHANGE UP (ref 3.5–5.3)
POTASSIUM SERPL-SCNC: 4 MMOL/L — SIGNIFICANT CHANGE UP (ref 3.5–5.3)
PROT SERPL-MCNC: 5.8 G/DL — LOW (ref 6–8.3)
RBC # BLD: 3.39 M/UL — LOW (ref 4.2–5.8)
RBC # FLD: 14.8 % — HIGH (ref 10.3–14.5)
SODIUM SERPL-SCNC: 141 MMOL/L — SIGNIFICANT CHANGE UP (ref 135–145)
SODIUM SERPL-SCNC: 141 MMOL/L — SIGNIFICANT CHANGE UP (ref 135–145)
WBC # BLD: 17.38 K/UL — HIGH (ref 3.8–10.5)
WBC # FLD AUTO: 17.38 K/UL — HIGH (ref 3.8–10.5)

## 2025-07-14 PROCEDURE — 71045 X-RAY EXAM CHEST 1 VIEW: CPT

## 2025-07-14 PROCEDURE — 80076 HEPATIC FUNCTION PANEL: CPT

## 2025-07-14 PROCEDURE — P9073: CPT

## 2025-07-14 PROCEDURE — 97116 GAIT TRAINING THERAPY: CPT

## 2025-07-14 PROCEDURE — 99291 CRITICAL CARE FIRST HOUR: CPT

## 2025-07-14 PROCEDURE — 85027 COMPLETE CBC AUTOMATED: CPT

## 2025-07-14 PROCEDURE — 82962 GLUCOSE BLOOD TEST: CPT

## 2025-07-14 PROCEDURE — 86850 RBC ANTIBODY SCREEN: CPT

## 2025-07-14 PROCEDURE — 93320 DOPPLER ECHO COMPLETE: CPT

## 2025-07-14 PROCEDURE — 80053 COMPREHEN METABOLIC PANEL: CPT

## 2025-07-14 PROCEDURE — 84100 ASSAY OF PHOSPHORUS: CPT

## 2025-07-14 PROCEDURE — P9045: CPT

## 2025-07-14 PROCEDURE — 83605 ASSAY OF LACTIC ACID: CPT

## 2025-07-14 PROCEDURE — 82947 ASSAY GLUCOSE BLOOD QUANT: CPT

## 2025-07-14 PROCEDURE — 86923 COMPATIBILITY TEST ELECTRIC: CPT

## 2025-07-14 PROCEDURE — 85730 THROMBOPLASTIN TIME PARTIAL: CPT

## 2025-07-14 PROCEDURE — 83735 ASSAY OF MAGNESIUM: CPT

## 2025-07-14 PROCEDURE — 31720 CLEARANCE OF AIRWAYS: CPT

## 2025-07-14 PROCEDURE — 84132 ASSAY OF SERUM POTASSIUM: CPT

## 2025-07-14 PROCEDURE — 82435 ASSAY OF BLOOD CHLORIDE: CPT

## 2025-07-14 PROCEDURE — 94660 CPAP INITIATION&MGMT: CPT

## 2025-07-14 PROCEDURE — 85520 HEPARIN ASSAY: CPT

## 2025-07-14 PROCEDURE — 82330 ASSAY OF CALCIUM: CPT

## 2025-07-14 PROCEDURE — 94002 VENT MGMT INPAT INIT DAY: CPT

## 2025-07-14 PROCEDURE — 84295 ASSAY OF SERUM SODIUM: CPT

## 2025-07-14 PROCEDURE — 86965 POOLING BLOOD PLATELETS: CPT

## 2025-07-14 PROCEDURE — 85396 CLOTTING ASSAY WHOLE BLOOD: CPT

## 2025-07-14 PROCEDURE — 85610 PROTHROMBIN TIME: CPT

## 2025-07-14 PROCEDURE — 82553 CREATINE MB FRACTION: CPT

## 2025-07-14 PROCEDURE — P9012: CPT

## 2025-07-14 PROCEDURE — 80048 BASIC METABOLIC PNL TOTAL CA: CPT

## 2025-07-14 PROCEDURE — 84439 ASSAY OF FREE THYROXINE: CPT

## 2025-07-14 PROCEDURE — 97166 OT EVAL MOD COMPLEX 45 MIN: CPT

## 2025-07-14 PROCEDURE — 82803 BLOOD GASES ANY COMBINATION: CPT

## 2025-07-14 PROCEDURE — 85018 HEMOGLOBIN: CPT

## 2025-07-14 PROCEDURE — 84484 ASSAY OF TROPONIN QUANT: CPT

## 2025-07-14 PROCEDURE — 86900 BLOOD TYPING SEROLOGIC ABO: CPT

## 2025-07-14 PROCEDURE — P9059: CPT

## 2025-07-14 PROCEDURE — 84436 ASSAY OF TOTAL THYROXINE: CPT

## 2025-07-14 PROCEDURE — 85384 FIBRINOGEN ACTIVITY: CPT

## 2025-07-14 PROCEDURE — P9016: CPT

## 2025-07-14 PROCEDURE — 82550 ASSAY OF CK (CPK): CPT

## 2025-07-14 PROCEDURE — 71045 X-RAY EXAM CHEST 1 VIEW: CPT | Mod: 26

## 2025-07-14 PROCEDURE — 84443 ASSAY THYROID STIM HORMONE: CPT

## 2025-07-14 PROCEDURE — 85014 HEMATOCRIT: CPT

## 2025-07-14 PROCEDURE — 97162 PT EVAL MOD COMPLEX 30 MIN: CPT

## 2025-07-14 PROCEDURE — 84480 ASSAY TRIIODOTHYRONINE (T3): CPT

## 2025-07-14 PROCEDURE — 93325 DOPPLER ECHO COLOR FLOW MAPG: CPT

## 2025-07-14 PROCEDURE — 86901 BLOOD TYPING SEROLOGIC RH(D): CPT

## 2025-07-14 PROCEDURE — 36415 COLL VENOUS BLD VENIPUNCTURE: CPT

## 2025-07-14 PROCEDURE — 85025 COMPLETE CBC W/AUTO DIFF WBC: CPT

## 2025-07-14 PROCEDURE — P9047: CPT

## 2025-07-14 RX ORDER — METOPROLOL SUCCINATE 50 MG/1
37.5 TABLET, EXTENDED RELEASE ORAL EVERY 8 HOURS
Refills: 0 | Status: DISCONTINUED | OUTPATIENT
Start: 2025-07-14 | End: 2025-07-15

## 2025-07-14 RX ORDER — SODIUM PHOSPHATE,DIBASIC DIHYD
15 POWDER (GRAM) MISCELLANEOUS ONCE
Refills: 0 | Status: COMPLETED | OUTPATIENT
Start: 2025-07-14 | End: 2025-07-14

## 2025-07-14 RX ORDER — SODIUM PHOSPHATE,DIBASIC DIHYD
30 POWDER (GRAM) MISCELLANEOUS ONCE
Refills: 0 | Status: COMPLETED | OUTPATIENT
Start: 2025-07-14 | End: 2025-07-14

## 2025-07-14 RX ORDER — MAGNESIUM SULFATE 500 MG/ML
2 SYRINGE (ML) INJECTION ONCE
Refills: 0 | Status: COMPLETED | OUTPATIENT
Start: 2025-07-14 | End: 2025-07-14

## 2025-07-14 RX ORDER — ALBUMIN (HUMAN) 12.5 G/50ML
100 INJECTION, SOLUTION INTRAVENOUS EVERY 8 HOURS
Refills: 0 | Status: COMPLETED | OUTPATIENT
Start: 2025-07-14 | End: 2025-07-14

## 2025-07-14 RX ADMIN — Medication 40 MILLIEQUIVALENT(S): at 06:21

## 2025-07-14 RX ADMIN — Medication 650 MILLIGRAM(S): at 00:22

## 2025-07-14 RX ADMIN — Medication 25 MILLIGRAM(S): at 05:23

## 2025-07-14 RX ADMIN — Medication 650 MILLIGRAM(S): at 05:23

## 2025-07-14 RX ADMIN — METOPROLOL SUCCINATE 37.5 MILLIGRAM(S): 50 TABLET, EXTENDED RELEASE ORAL at 13:30

## 2025-07-14 RX ADMIN — Medication 3 MILLILITER(S): at 21:36

## 2025-07-14 RX ADMIN — Medication 20 MILLIGRAM(S): at 17:08

## 2025-07-14 RX ADMIN — Medication 40 MILLIGRAM(S): at 06:01

## 2025-07-14 RX ADMIN — GABAPENTIN 100 MILLIGRAM(S): 400 CAPSULE ORAL at 05:23

## 2025-07-14 RX ADMIN — Medication 50 MILLIEQUIVALENT(S): at 02:32

## 2025-07-14 RX ADMIN — METOPROLOL SUCCINATE 37.5 MILLIGRAM(S): 50 TABLET, EXTENDED RELEASE ORAL at 21:51

## 2025-07-14 RX ADMIN — Medication 0.1 MILLIGRAM(S): at 17:08

## 2025-07-14 RX ADMIN — Medication 25 GRAM(S): at 12:07

## 2025-07-14 RX ADMIN — ATORVASTATIN CALCIUM 80 MILLIGRAM(S): 80 TABLET, FILM COATED ORAL at 21:51

## 2025-07-14 RX ADMIN — HEPARIN SODIUM 5000 UNIT(S): 1000 INJECTION INTRAVENOUS; SUBCUTANEOUS at 05:23

## 2025-07-14 RX ADMIN — Medication 50 MILLIEQUIVALENT(S): at 01:37

## 2025-07-14 RX ADMIN — GABAPENTIN 100 MILLIGRAM(S): 400 CAPSULE ORAL at 21:51

## 2025-07-14 RX ADMIN — ALBUMIN (HUMAN) 50 MILLILITER(S): 12.5 INJECTION, SOLUTION INTRAVENOUS at 12:08

## 2025-07-14 RX ADMIN — Medication 2 TABLET(S): at 21:51

## 2025-07-14 RX ADMIN — Medication 50 MILLIGRAM(S): at 13:46

## 2025-07-14 RX ADMIN — Medication 50 MILLIGRAM(S): at 21:51

## 2025-07-14 RX ADMIN — HEPARIN SODIUM 5000 UNIT(S): 1000 INJECTION INTRAVENOUS; SUBCUTANEOUS at 13:30

## 2025-07-14 RX ADMIN — Medication 0.1 MILLIGRAM(S): at 05:23

## 2025-07-14 RX ADMIN — ALBUMIN (HUMAN) 50 MILLILITER(S): 12.5 INJECTION, SOLUTION INTRAVENOUS at 21:52

## 2025-07-14 RX ADMIN — Medication 50 MILLIGRAM(S): at 05:22

## 2025-07-14 RX ADMIN — Medication 500 MILLIGRAM(S): at 17:08

## 2025-07-14 RX ADMIN — Medication 25 MILLIGRAM(S): at 13:30

## 2025-07-14 RX ADMIN — Medication 81 MILLIGRAM(S): at 12:07

## 2025-07-14 RX ADMIN — Medication 500 MILLIGRAM(S): at 05:22

## 2025-07-14 RX ADMIN — AMLODIPINE BESYLATE 10 MILLIGRAM(S): 10 TABLET ORAL at 05:23

## 2025-07-14 RX ADMIN — Medication 20 MILLIGRAM(S): at 05:23

## 2025-07-14 RX ADMIN — Medication 1 APPLICATION(S): at 12:10

## 2025-07-14 RX ADMIN — METOPROLOL SUCCINATE 25 MILLIGRAM(S): 50 TABLET, EXTENDED RELEASE ORAL at 05:22

## 2025-07-14 RX ADMIN — Medication 25 MILLIGRAM(S): at 21:51

## 2025-07-14 RX ADMIN — Medication 85 MILLIMOLE(S): at 13:31

## 2025-07-14 RX ADMIN — POLYETHYLENE GLYCOL 3350 17 GRAM(S): 17 POWDER, FOR SOLUTION ORAL at 05:22

## 2025-07-14 RX ADMIN — ALBUMIN (HUMAN) 50 MILLILITER(S): 12.5 INJECTION, SOLUTION INTRAVENOUS at 13:30

## 2025-07-14 RX ADMIN — Medication 100 MILLIGRAM(S): at 13:30

## 2025-07-14 RX ADMIN — Medication 650 MILLIGRAM(S): at 12:07

## 2025-07-14 RX ADMIN — Medication 650 MILLIGRAM(S): at 06:04

## 2025-07-14 RX ADMIN — GABAPENTIN 100 MILLIGRAM(S): 400 CAPSULE ORAL at 13:41

## 2025-07-14 RX ADMIN — Medication 63.75 MILLIMOLE(S): at 01:37

## 2025-07-14 RX ADMIN — HEPARIN SODIUM 5000 UNIT(S): 1000 INJECTION INTRAVENOUS; SUBCUTANEOUS at 21:51

## 2025-07-14 RX ADMIN — Medication 50 MILLIEQUIVALENT(S): at 03:39

## 2025-07-15 LAB
ALBUMIN SERPL ELPH-MCNC: 3.8 G/DL — SIGNIFICANT CHANGE UP (ref 3.3–5)
ALP SERPL-CCNC: 51 U/L — SIGNIFICANT CHANGE UP (ref 40–120)
ALT FLD-CCNC: 16 U/L — SIGNIFICANT CHANGE UP (ref 10–45)
ANION GAP SERPL CALC-SCNC: 14 MMOL/L — SIGNIFICANT CHANGE UP (ref 5–17)
AST SERPL-CCNC: 24 U/L — SIGNIFICANT CHANGE UP (ref 10–40)
BASOPHILS # BLD AUTO: 0.06 K/UL — SIGNIFICANT CHANGE UP (ref 0–0.2)
BASOPHILS NFR BLD AUTO: 0.4 % — SIGNIFICANT CHANGE UP (ref 0–2)
BILIRUB SERPL-MCNC: 1.4 MG/DL — HIGH (ref 0.2–1.2)
BUN SERPL-MCNC: 18 MG/DL — SIGNIFICANT CHANGE UP (ref 7–23)
CALCIUM SERPL-MCNC: 9.1 MG/DL — SIGNIFICANT CHANGE UP (ref 8.4–10.5)
CHLORIDE SERPL-SCNC: 102 MMOL/L — SIGNIFICANT CHANGE UP (ref 96–108)
CO2 SERPL-SCNC: 26 MMOL/L — SIGNIFICANT CHANGE UP (ref 22–31)
CREAT SERPL-MCNC: 1.14 MG/DL — SIGNIFICANT CHANGE UP (ref 0.5–1.3)
EGFR: 69 ML/MIN/1.73M2 — SIGNIFICANT CHANGE UP
EGFR: 69 ML/MIN/1.73M2 — SIGNIFICANT CHANGE UP
EOSINOPHIL # BLD AUTO: 0.2 K/UL — SIGNIFICANT CHANGE UP (ref 0–0.5)
EOSINOPHIL NFR BLD AUTO: 1.5 % — SIGNIFICANT CHANGE UP (ref 0–6)
GLUCOSE SERPL-MCNC: 90 MG/DL — SIGNIFICANT CHANGE UP (ref 70–99)
HCT VFR BLD CALC: 29.3 % — LOW (ref 39–50)
HGB BLD-MCNC: 9.6 G/DL — LOW (ref 13–17)
IMM GRANULOCYTES # BLD AUTO: 0.1 K/UL — HIGH (ref 0–0.07)
IMM GRANULOCYTES NFR BLD AUTO: 0.7 % — SIGNIFICANT CHANGE UP (ref 0–0.9)
LYMPHOCYTES # BLD AUTO: 1.35 K/UL — SIGNIFICANT CHANGE UP (ref 1–3.3)
LYMPHOCYTES NFR BLD AUTO: 9.9 % — LOW (ref 13–44)
MAGNESIUM SERPL-MCNC: 2.2 MG/DL — SIGNIFICANT CHANGE UP (ref 1.6–2.6)
MCHC RBC-ENTMCNC: 30.3 PG — SIGNIFICANT CHANGE UP (ref 27–34)
MCHC RBC-ENTMCNC: 32.8 G/DL — SIGNIFICANT CHANGE UP (ref 32–36)
MCV RBC AUTO: 92.4 FL — SIGNIFICANT CHANGE UP (ref 80–100)
MONOCYTES # BLD AUTO: 1.08 K/UL — HIGH (ref 0–0.9)
MONOCYTES NFR BLD AUTO: 7.9 % — SIGNIFICANT CHANGE UP (ref 2–14)
NEUTROPHILS # BLD AUTO: 10.8 K/UL — HIGH (ref 1.8–7.4)
NEUTROPHILS NFR BLD AUTO: 79.6 % — HIGH (ref 43–77)
NRBC # BLD AUTO: 0 K/UL — SIGNIFICANT CHANGE UP (ref 0–0)
NRBC # FLD: 0 K/UL — SIGNIFICANT CHANGE UP (ref 0–0)
NRBC BLD AUTO-RTO: 0 /100 WBCS — SIGNIFICANT CHANGE UP (ref 0–0)
PHOSPHATE SERPL-MCNC: 1.9 MG/DL — LOW (ref 2.5–4.5)
PLATELET # BLD AUTO: 221 K/UL — SIGNIFICANT CHANGE UP (ref 150–400)
PMV BLD: 10.6 FL — SIGNIFICANT CHANGE UP (ref 7–13)
POTASSIUM SERPL-MCNC: 3.5 MMOL/L — SIGNIFICANT CHANGE UP (ref 3.5–5.3)
POTASSIUM SERPL-SCNC: 3.5 MMOL/L — SIGNIFICANT CHANGE UP (ref 3.5–5.3)
PROT SERPL-MCNC: 6.3 G/DL — SIGNIFICANT CHANGE UP (ref 6–8.3)
RBC # BLD: 3.17 M/UL — LOW (ref 4.2–5.8)
RBC # FLD: 15 % — HIGH (ref 10.3–14.5)
SODIUM SERPL-SCNC: 142 MMOL/L — SIGNIFICANT CHANGE UP (ref 135–145)
WBC # BLD: 13.59 K/UL — HIGH (ref 3.8–10.5)
WBC # FLD AUTO: 13.59 K/UL — HIGH (ref 3.8–10.5)

## 2025-07-15 PROCEDURE — 93010 ELECTROCARDIOGRAM REPORT: CPT

## 2025-07-15 RX ORDER — MAGNESIUM SULFATE 500 MG/ML
2 SYRINGE (ML) INJECTION ONCE
Refills: 0 | Status: COMPLETED | OUTPATIENT
Start: 2025-07-15 | End: 2025-07-15

## 2025-07-15 RX ORDER — SOD PHOS DI, MONO/K PHOS MONO 250 MG
1 TABLET ORAL ONCE
Refills: 0 | Status: COMPLETED | OUTPATIENT
Start: 2025-07-15 | End: 2025-07-15

## 2025-07-15 RX ORDER — METOPROLOL SUCCINATE 50 MG/1
12.5 TABLET, EXTENDED RELEASE ORAL ONCE
Refills: 0 | Status: COMPLETED | OUTPATIENT
Start: 2025-07-15 | End: 2025-07-15

## 2025-07-15 RX ORDER — METOPROLOL SUCCINATE 50 MG/1
50 TABLET, EXTENDED RELEASE ORAL EVERY 8 HOURS
Refills: 0 | Status: DISCONTINUED | OUTPATIENT
Start: 2025-07-15 | End: 2025-07-17

## 2025-07-15 RX ORDER — TORSEMIDE 10 MG
20 TABLET ORAL DAILY
Refills: 0 | Status: DISCONTINUED | OUTPATIENT
Start: 2025-07-16 | End: 2025-07-16

## 2025-07-15 RX ADMIN — METOPROLOL SUCCINATE 37.5 MILLIGRAM(S): 50 TABLET, EXTENDED RELEASE ORAL at 06:04

## 2025-07-15 RX ADMIN — Medication 20 MILLIGRAM(S): at 06:05

## 2025-07-15 RX ADMIN — GABAPENTIN 100 MILLIGRAM(S): 400 CAPSULE ORAL at 13:17

## 2025-07-15 RX ADMIN — Medication 50 MILLIGRAM(S): at 21:29

## 2025-07-15 RX ADMIN — Medication 20 MILLIEQUIVALENT(S): at 21:27

## 2025-07-15 RX ADMIN — Medication 50 MILLIGRAM(S): at 06:07

## 2025-07-15 RX ADMIN — Medication 25 GRAM(S): at 20:34

## 2025-07-15 RX ADMIN — HEPARIN SODIUM 5000 UNIT(S): 1000 INJECTION INTRAVENOUS; SUBCUTANEOUS at 21:28

## 2025-07-15 RX ADMIN — Medication 50 MILLIGRAM(S): at 13:13

## 2025-07-15 RX ADMIN — Medication 500 MILLIGRAM(S): at 06:05

## 2025-07-15 RX ADMIN — METOPROLOL SUCCINATE 12.5 MILLIGRAM(S): 50 TABLET, EXTENDED RELEASE ORAL at 09:02

## 2025-07-15 RX ADMIN — GABAPENTIN 100 MILLIGRAM(S): 400 CAPSULE ORAL at 21:28

## 2025-07-15 RX ADMIN — Medication 1 APPLICATION(S): at 13:13

## 2025-07-15 RX ADMIN — Medication 3 MILLILITER(S): at 22:00

## 2025-07-15 RX ADMIN — Medication 40 MILLIGRAM(S): at 06:05

## 2025-07-15 RX ADMIN — METOPROLOL SUCCINATE 50 MILLIGRAM(S): 50 TABLET, EXTENDED RELEASE ORAL at 13:16

## 2025-07-15 RX ADMIN — Medication 50 MILLIGRAM(S): at 13:16

## 2025-07-15 RX ADMIN — Medication 500 MILLIGRAM(S): at 17:18

## 2025-07-15 RX ADMIN — Medication 100 MILLIGRAM(S): at 14:52

## 2025-07-15 RX ADMIN — Medication 25 MILLIGRAM(S): at 06:05

## 2025-07-15 RX ADMIN — Medication 40 MILLIEQUIVALENT(S): at 09:02

## 2025-07-15 RX ADMIN — AMLODIPINE BESYLATE 10 MILLIGRAM(S): 10 TABLET ORAL at 06:05

## 2025-07-15 RX ADMIN — HEPARIN SODIUM 5000 UNIT(S): 1000 INJECTION INTRAVENOUS; SUBCUTANEOUS at 13:13

## 2025-07-15 RX ADMIN — Medication 0.1 MILLIGRAM(S): at 06:05

## 2025-07-15 RX ADMIN — GABAPENTIN 100 MILLIGRAM(S): 400 CAPSULE ORAL at 06:05

## 2025-07-15 RX ADMIN — Medication 1 PACKET(S): at 20:34

## 2025-07-15 RX ADMIN — METOPROLOL SUCCINATE 50 MILLIGRAM(S): 50 TABLET, EXTENDED RELEASE ORAL at 20:41

## 2025-07-15 RX ADMIN — Medication 25 MILLIGRAM(S): at 09:02

## 2025-07-15 RX ADMIN — HEPARIN SODIUM 5000 UNIT(S): 1000 INJECTION INTRAVENOUS; SUBCUTANEOUS at 06:04

## 2025-07-15 RX ADMIN — ATORVASTATIN CALCIUM 80 MILLIGRAM(S): 80 TABLET, FILM COATED ORAL at 21:30

## 2025-07-15 RX ADMIN — Medication 81 MILLIGRAM(S): at 13:13

## 2025-07-15 RX ADMIN — Medication 3 MILLILITER(S): at 06:49

## 2025-07-15 RX ADMIN — Medication 0.1 MILLIGRAM(S): at 17:19

## 2025-07-15 RX ADMIN — Medication 3 MILLILITER(S): at 13:20

## 2025-07-16 ENCOUNTER — TRANSCRIPTION ENCOUNTER (OUTPATIENT)
Age: 70
End: 2025-07-16

## 2025-07-16 DIAGNOSIS — I48.0 PAROXYSMAL ATRIAL FIBRILLATION: ICD-10-CM

## 2025-07-16 LAB
ANION GAP SERPL CALC-SCNC: 13 MMOL/L — SIGNIFICANT CHANGE UP (ref 5–17)
BASOPHILS # BLD AUTO: 0.07 K/UL — SIGNIFICANT CHANGE UP (ref 0–0.2)
BASOPHILS NFR BLD AUTO: 0.6 % — SIGNIFICANT CHANGE UP (ref 0–2)
BUN SERPL-MCNC: 19 MG/DL — SIGNIFICANT CHANGE UP (ref 7–23)
CALCIUM SERPL-MCNC: 9 MG/DL — SIGNIFICANT CHANGE UP (ref 8.4–10.5)
CHLORIDE SERPL-SCNC: 104 MMOL/L — SIGNIFICANT CHANGE UP (ref 96–108)
CO2 SERPL-SCNC: 24 MMOL/L — SIGNIFICANT CHANGE UP (ref 22–31)
CREAT SERPL-MCNC: 1.15 MG/DL — SIGNIFICANT CHANGE UP (ref 0.5–1.3)
EGFR: 68 ML/MIN/1.73M2 — SIGNIFICANT CHANGE UP
EGFR: 68 ML/MIN/1.73M2 — SIGNIFICANT CHANGE UP
EOSINOPHIL # BLD AUTO: 0.3 K/UL — SIGNIFICANT CHANGE UP (ref 0–0.5)
EOSINOPHIL NFR BLD AUTO: 2.4 % — SIGNIFICANT CHANGE UP (ref 0–6)
GLUCOSE SERPL-MCNC: 102 MG/DL — HIGH (ref 70–99)
HCT VFR BLD CALC: 31 % — LOW (ref 39–50)
HGB BLD-MCNC: 9.9 G/DL — LOW (ref 13–17)
IMM GRANULOCYTES # BLD AUTO: 0.12 K/UL — HIGH (ref 0–0.07)
IMM GRANULOCYTES NFR BLD AUTO: 1 % — HIGH (ref 0–0.9)
LYMPHOCYTES # BLD AUTO: 1.69 K/UL — SIGNIFICANT CHANGE UP (ref 1–3.3)
LYMPHOCYTES NFR BLD AUTO: 13.6 % — SIGNIFICANT CHANGE UP (ref 13–44)
MCHC RBC-ENTMCNC: 29.4 PG — SIGNIFICANT CHANGE UP (ref 27–34)
MCHC RBC-ENTMCNC: 31.9 G/DL — LOW (ref 32–36)
MCV RBC AUTO: 92 FL — SIGNIFICANT CHANGE UP (ref 80–100)
MONOCYTES # BLD AUTO: 1.19 K/UL — HIGH (ref 0–0.9)
MONOCYTES NFR BLD AUTO: 9.6 % — SIGNIFICANT CHANGE UP (ref 2–14)
NEUTROPHILS # BLD AUTO: 9.06 K/UL — HIGH (ref 1.8–7.4)
NEUTROPHILS NFR BLD AUTO: 72.8 % — SIGNIFICANT CHANGE UP (ref 43–77)
NRBC # BLD AUTO: 0 K/UL — SIGNIFICANT CHANGE UP (ref 0–0)
NRBC # FLD: 0 K/UL — SIGNIFICANT CHANGE UP (ref 0–0)
NRBC BLD AUTO-RTO: 0 /100 WBCS — SIGNIFICANT CHANGE UP (ref 0–0)
PLATELET # BLD AUTO: 262 K/UL — SIGNIFICANT CHANGE UP (ref 150–400)
PMV BLD: 10.6 FL — SIGNIFICANT CHANGE UP (ref 7–13)
POTASSIUM SERPL-MCNC: 3.6 MMOL/L — SIGNIFICANT CHANGE UP (ref 3.5–5.3)
POTASSIUM SERPL-SCNC: 3.6 MMOL/L — SIGNIFICANT CHANGE UP (ref 3.5–5.3)
RBC # BLD: 3.37 M/UL — LOW (ref 4.2–5.8)
RBC # FLD: 15.1 % — HIGH (ref 10.3–14.5)
SODIUM SERPL-SCNC: 141 MMOL/L — SIGNIFICANT CHANGE UP (ref 135–145)
WBC # BLD: 12.43 K/UL — HIGH (ref 3.8–10.5)
WBC # FLD AUTO: 12.43 K/UL — HIGH (ref 3.8–10.5)

## 2025-07-16 RX ORDER — POTASSIUM BICARBONATE/CIT AC 25 MEQ
25 TABLET, EFFERVESCENT ORAL
Refills: 0 | Status: COMPLETED | OUTPATIENT
Start: 2025-07-16 | End: 2025-07-16

## 2025-07-16 RX ORDER — TORSEMIDE 10 MG
20 TABLET ORAL
Refills: 0 | Status: DISCONTINUED | OUTPATIENT
Start: 2025-07-16 | End: 2025-07-16

## 2025-07-16 RX ORDER — AMIODARONE HYDROCHLORIDE 50 MG/ML
200 INJECTION, SOLUTION INTRAVENOUS DAILY
Refills: 0 | Status: DISCONTINUED | OUTPATIENT
Start: 2025-07-19 | End: 2025-07-22

## 2025-07-16 RX ORDER — AMIODARONE HYDROCHLORIDE 50 MG/ML
INJECTION, SOLUTION INTRAVENOUS
Refills: 0 | Status: DISCONTINUED | OUTPATIENT
Start: 2025-07-19 | End: 2025-07-22

## 2025-07-16 RX ORDER — AMIODARONE HYDROCHLORIDE 50 MG/ML
400 INJECTION, SOLUTION INTRAVENOUS EVERY 8 HOURS
Refills: 0 | Status: COMPLETED | OUTPATIENT
Start: 2025-07-16 | End: 2025-07-19

## 2025-07-16 RX ORDER — TORSEMIDE 10 MG
20 TABLET ORAL DAILY
Refills: 0 | Status: DISCONTINUED | OUTPATIENT
Start: 2025-07-16 | End: 2025-07-22

## 2025-07-16 RX ADMIN — HEPARIN SODIUM 5000 UNIT(S): 1000 INJECTION INTRAVENOUS; SUBCUTANEOUS at 21:23

## 2025-07-16 RX ADMIN — HEPARIN SODIUM 5000 UNIT(S): 1000 INJECTION INTRAVENOUS; SUBCUTANEOUS at 06:15

## 2025-07-16 RX ADMIN — Medication 25 MILLIEQUIVALENT(S): at 14:22

## 2025-07-16 RX ADMIN — METOPROLOL SUCCINATE 50 MILLIGRAM(S): 50 TABLET, EXTENDED RELEASE ORAL at 06:01

## 2025-07-16 RX ADMIN — Medication 100 MILLIGRAM(S): at 13:15

## 2025-07-16 RX ADMIN — Medication 0.1 MILLIGRAM(S): at 06:01

## 2025-07-16 RX ADMIN — Medication 50 MILLIGRAM(S): at 21:16

## 2025-07-16 RX ADMIN — Medication 1 APPLICATION(S): at 13:14

## 2025-07-16 RX ADMIN — AMIODARONE HYDROCHLORIDE 400 MILLIGRAM(S): 50 INJECTION, SOLUTION INTRAVENOUS at 06:00

## 2025-07-16 RX ADMIN — Medication 50 MILLIGRAM(S): at 13:22

## 2025-07-16 RX ADMIN — ATORVASTATIN CALCIUM 80 MILLIGRAM(S): 80 TABLET, FILM COATED ORAL at 21:17

## 2025-07-16 RX ADMIN — Medication 25 MILLIEQUIVALENT(S): at 16:03

## 2025-07-16 RX ADMIN — Medication 3 MILLILITER(S): at 22:00

## 2025-07-16 RX ADMIN — Medication 3 MILLILITER(S): at 13:14

## 2025-07-16 RX ADMIN — GABAPENTIN 100 MILLIGRAM(S): 400 CAPSULE ORAL at 13:27

## 2025-07-16 RX ADMIN — Medication 3 MILLILITER(S): at 06:15

## 2025-07-16 RX ADMIN — Medication 50 MILLIGRAM(S): at 06:00

## 2025-07-16 RX ADMIN — Medication 81 MILLIGRAM(S): at 13:20

## 2025-07-16 RX ADMIN — METOPROLOL SUCCINATE 50 MILLIGRAM(S): 50 TABLET, EXTENDED RELEASE ORAL at 13:22

## 2025-07-16 RX ADMIN — AMIODARONE HYDROCHLORIDE 400 MILLIGRAM(S): 50 INJECTION, SOLUTION INTRAVENOUS at 13:21

## 2025-07-16 RX ADMIN — Medication 20 MILLIGRAM(S): at 16:03

## 2025-07-16 RX ADMIN — Medication 0.1 MILLIGRAM(S): at 17:12

## 2025-07-16 RX ADMIN — AMIODARONE HYDROCHLORIDE 400 MILLIGRAM(S): 50 INJECTION, SOLUTION INTRAVENOUS at 21:16

## 2025-07-16 RX ADMIN — Medication 25 MILLIEQUIVALENT(S): at 21:16

## 2025-07-16 RX ADMIN — METOPROLOL SUCCINATE 50 MILLIGRAM(S): 50 TABLET, EXTENDED RELEASE ORAL at 21:16

## 2025-07-16 RX ADMIN — HEPARIN SODIUM 5000 UNIT(S): 1000 INJECTION INTRAVENOUS; SUBCUTANEOUS at 13:20

## 2025-07-16 RX ADMIN — Medication 500 MILLIGRAM(S): at 06:01

## 2025-07-16 RX ADMIN — Medication 20 MILLIGRAM(S): at 06:01

## 2025-07-16 RX ADMIN — AMLODIPINE BESYLATE 10 MILLIGRAM(S): 10 TABLET ORAL at 06:01

## 2025-07-16 RX ADMIN — Medication 40 MILLIGRAM(S): at 06:01

## 2025-07-16 RX ADMIN — GABAPENTIN 100 MILLIGRAM(S): 400 CAPSULE ORAL at 06:00

## 2025-07-17 LAB
ANION GAP SERPL CALC-SCNC: 13 MMOL/L — SIGNIFICANT CHANGE UP (ref 5–17)
BUN SERPL-MCNC: 20 MG/DL — SIGNIFICANT CHANGE UP (ref 7–23)
CALCIUM SERPL-MCNC: 9.4 MG/DL — SIGNIFICANT CHANGE UP (ref 8.4–10.5)
CHLORIDE SERPL-SCNC: 104 MMOL/L — SIGNIFICANT CHANGE UP (ref 96–108)
CO2 SERPL-SCNC: 24 MMOL/L — SIGNIFICANT CHANGE UP (ref 22–31)
CREAT SERPL-MCNC: 1.15 MG/DL — SIGNIFICANT CHANGE UP (ref 0.5–1.3)
EGFR: 68 ML/MIN/1.73M2 — SIGNIFICANT CHANGE UP
EGFR: 68 ML/MIN/1.73M2 — SIGNIFICANT CHANGE UP
GLUCOSE SERPL-MCNC: 103 MG/DL — HIGH (ref 70–99)
HCT VFR BLD CALC: 29.3 % — LOW (ref 39–50)
HGB BLD-MCNC: 9.7 G/DL — LOW (ref 13–17)
MCHC RBC-ENTMCNC: 30.3 PG — SIGNIFICANT CHANGE UP (ref 27–34)
MCHC RBC-ENTMCNC: 33.1 G/DL — SIGNIFICANT CHANGE UP (ref 32–36)
MCV RBC AUTO: 91.6 FL — SIGNIFICANT CHANGE UP (ref 80–100)
NRBC # BLD AUTO: 0 K/UL — SIGNIFICANT CHANGE UP (ref 0–0)
NRBC # FLD: 0 K/UL — SIGNIFICANT CHANGE UP (ref 0–0)
NRBC BLD AUTO-RTO: 0 /100 WBCS — SIGNIFICANT CHANGE UP (ref 0–0)
PLATELET # BLD AUTO: 277 K/UL — SIGNIFICANT CHANGE UP (ref 150–400)
PMV BLD: 10.6 FL — SIGNIFICANT CHANGE UP (ref 7–13)
POTASSIUM SERPL-MCNC: 4.3 MMOL/L — SIGNIFICANT CHANGE UP (ref 3.5–5.3)
POTASSIUM SERPL-SCNC: 4.3 MMOL/L — SIGNIFICANT CHANGE UP (ref 3.5–5.3)
RBC # BLD: 3.2 M/UL — LOW (ref 4.2–5.8)
RBC # FLD: 15.1 % — HIGH (ref 10.3–14.5)
SODIUM SERPL-SCNC: 141 MMOL/L — SIGNIFICANT CHANGE UP (ref 135–145)
WBC # BLD: 14.82 K/UL — HIGH (ref 3.8–10.5)
WBC # FLD AUTO: 14.82 K/UL — HIGH (ref 3.8–10.5)

## 2025-07-17 PROCEDURE — 71045 X-RAY EXAM CHEST 1 VIEW: CPT | Mod: 26

## 2025-07-17 RX ORDER — MELATONIN 5 MG
5 TABLET ORAL AT BEDTIME
Refills: 0 | Status: DISCONTINUED | OUTPATIENT
Start: 2025-07-17 | End: 2025-07-22

## 2025-07-17 RX ORDER — METOPROLOL SUCCINATE 50 MG/1
150 TABLET, EXTENDED RELEASE ORAL DAILY
Refills: 0 | Status: DISCONTINUED | OUTPATIENT
Start: 2025-07-17 | End: 2025-07-22

## 2025-07-17 RX ORDER — ALPRAZOLAM 0.5 MG
0.12 TABLET, EXTENDED RELEASE 24 HR ORAL EVERY 8 HOURS
Refills: 0 | Status: DISCONTINUED | OUTPATIENT
Start: 2025-07-17 | End: 2025-07-22

## 2025-07-17 RX ADMIN — Medication 81 MILLIGRAM(S): at 12:26

## 2025-07-17 RX ADMIN — AMIODARONE HYDROCHLORIDE 400 MILLIGRAM(S): 50 INJECTION, SOLUTION INTRAVENOUS at 14:07

## 2025-07-17 RX ADMIN — Medication 0.12 MILLIGRAM(S): at 21:20

## 2025-07-17 RX ADMIN — Medication 50 MILLIGRAM(S): at 14:08

## 2025-07-17 RX ADMIN — METOPROLOL SUCCINATE 150 MILLIGRAM(S): 50 TABLET, EXTENDED RELEASE ORAL at 10:55

## 2025-07-17 RX ADMIN — Medication 3 MILLILITER(S): at 06:24

## 2025-07-17 RX ADMIN — Medication 1 APPLICATION(S): at 12:27

## 2025-07-17 RX ADMIN — Medication 3 MILLILITER(S): at 20:13

## 2025-07-17 RX ADMIN — AMLODIPINE BESYLATE 10 MILLIGRAM(S): 10 TABLET ORAL at 05:27

## 2025-07-17 RX ADMIN — Medication 0.1 MILLIGRAM(S): at 05:27

## 2025-07-17 RX ADMIN — METOPROLOL SUCCINATE 50 MILLIGRAM(S): 50 TABLET, EXTENDED RELEASE ORAL at 05:27

## 2025-07-17 RX ADMIN — HEPARIN SODIUM 5000 UNIT(S): 1000 INJECTION INTRAVENOUS; SUBCUTANEOUS at 05:27

## 2025-07-17 RX ADMIN — Medication 100 MILLIGRAM(S): at 14:08

## 2025-07-17 RX ADMIN — Medication 5 MILLIGRAM(S): at 21:20

## 2025-07-17 RX ADMIN — Medication 40 MILLIGRAM(S): at 05:27

## 2025-07-17 RX ADMIN — HEPARIN SODIUM 5000 UNIT(S): 1000 INJECTION INTRAVENOUS; SUBCUTANEOUS at 14:07

## 2025-07-17 RX ADMIN — HEPARIN SODIUM 5000 UNIT(S): 1000 INJECTION INTRAVENOUS; SUBCUTANEOUS at 21:21

## 2025-07-17 RX ADMIN — Medication 0.12 MILLIGRAM(S): at 14:53

## 2025-07-17 RX ADMIN — AMIODARONE HYDROCHLORIDE 400 MILLIGRAM(S): 50 INJECTION, SOLUTION INTRAVENOUS at 05:27

## 2025-07-17 RX ADMIN — Medication 50 MILLIGRAM(S): at 05:26

## 2025-07-17 RX ADMIN — Medication 50 MILLIGRAM(S): at 21:20

## 2025-07-17 RX ADMIN — Medication 50 MILLIGRAM(S): at 21:21

## 2025-07-17 RX ADMIN — Medication 3 MILLILITER(S): at 14:10

## 2025-07-17 RX ADMIN — Medication 0.1 MILLIGRAM(S): at 17:26

## 2025-07-17 RX ADMIN — Medication 20 MILLIGRAM(S): at 05:27

## 2025-07-17 RX ADMIN — AMIODARONE HYDROCHLORIDE 400 MILLIGRAM(S): 50 INJECTION, SOLUTION INTRAVENOUS at 21:21

## 2025-07-17 RX ADMIN — ATORVASTATIN CALCIUM 80 MILLIGRAM(S): 80 TABLET, FILM COATED ORAL at 21:20

## 2025-07-18 LAB
ANION GAP SERPL CALC-SCNC: 14 MMOL/L — SIGNIFICANT CHANGE UP (ref 5–17)
BUN SERPL-MCNC: 25 MG/DL — HIGH (ref 7–23)
C DIFF GDH STL QL: NEGATIVE — SIGNIFICANT CHANGE UP
C DIFF GDH STL QL: SIGNIFICANT CHANGE UP
CALCIUM SERPL-MCNC: 9.5 MG/DL — SIGNIFICANT CHANGE UP (ref 8.4–10.5)
CHLORIDE SERPL-SCNC: 106 MMOL/L — SIGNIFICANT CHANGE UP (ref 96–108)
CO2 SERPL-SCNC: 21 MMOL/L — LOW (ref 22–31)
CREAT SERPL-MCNC: 1.23 MG/DL — SIGNIFICANT CHANGE UP (ref 0.5–1.3)
EGFR: 63 ML/MIN/1.73M2 — SIGNIFICANT CHANGE UP
EGFR: 63 ML/MIN/1.73M2 — SIGNIFICANT CHANGE UP
GLUCOSE SERPL-MCNC: 100 MG/DL — HIGH (ref 70–99)
HCT VFR BLD CALC: 29.6 % — LOW (ref 39–50)
HGB BLD-MCNC: 9.7 G/DL — LOW (ref 13–17)
MCHC RBC-ENTMCNC: 30.4 PG — SIGNIFICANT CHANGE UP (ref 27–34)
MCHC RBC-ENTMCNC: 32.8 G/DL — SIGNIFICANT CHANGE UP (ref 32–36)
MCV RBC AUTO: 92.8 FL — SIGNIFICANT CHANGE UP (ref 80–100)
NRBC # BLD AUTO: 0 K/UL — SIGNIFICANT CHANGE UP (ref 0–0)
NRBC # FLD: 0 K/UL — SIGNIFICANT CHANGE UP (ref 0–0)
NRBC BLD AUTO-RTO: 0 /100 WBCS — SIGNIFICANT CHANGE UP (ref 0–0)
PLATELET # BLD AUTO: 301 K/UL — SIGNIFICANT CHANGE UP (ref 150–400)
PMV BLD: 10.7 FL — SIGNIFICANT CHANGE UP (ref 7–13)
POTASSIUM SERPL-MCNC: 4.4 MMOL/L — SIGNIFICANT CHANGE UP (ref 3.5–5.3)
POTASSIUM SERPL-SCNC: 4.4 MMOL/L — SIGNIFICANT CHANGE UP (ref 3.5–5.3)
RBC # BLD: 3.19 M/UL — LOW (ref 4.2–5.8)
RBC # FLD: 15.2 % — HIGH (ref 10.3–14.5)
SODIUM SERPL-SCNC: 141 MMOL/L — SIGNIFICANT CHANGE UP (ref 135–145)
WBC # BLD: 15.57 K/UL — HIGH (ref 3.8–10.5)
WBC # FLD AUTO: 15.57 K/UL — HIGH (ref 3.8–10.5)

## 2025-07-18 RX ADMIN — Medication 100 MILLIGRAM(S): at 12:40

## 2025-07-18 RX ADMIN — Medication 0.1 MILLIGRAM(S): at 17:19

## 2025-07-18 RX ADMIN — AMLODIPINE BESYLATE 10 MILLIGRAM(S): 10 TABLET ORAL at 06:25

## 2025-07-18 RX ADMIN — AMIODARONE HYDROCHLORIDE 400 MILLIGRAM(S): 50 INJECTION, SOLUTION INTRAVENOUS at 22:11

## 2025-07-18 RX ADMIN — HEPARIN SODIUM 5000 UNIT(S): 1000 INJECTION INTRAVENOUS; SUBCUTANEOUS at 12:40

## 2025-07-18 RX ADMIN — Medication 3 MILLILITER(S): at 06:00

## 2025-07-18 RX ADMIN — Medication 3 MILLILITER(S): at 14:00

## 2025-07-18 RX ADMIN — Medication 0.12 MILLIGRAM(S): at 06:25

## 2025-07-18 RX ADMIN — Medication 0.1 MILLIGRAM(S): at 06:26

## 2025-07-18 RX ADMIN — METOPROLOL SUCCINATE 150 MILLIGRAM(S): 50 TABLET, EXTENDED RELEASE ORAL at 06:27

## 2025-07-18 RX ADMIN — Medication 1 APPLICATION(S): at 12:49

## 2025-07-18 RX ADMIN — Medication 3 MILLILITER(S): at 22:00

## 2025-07-18 RX ADMIN — AMIODARONE HYDROCHLORIDE 400 MILLIGRAM(S): 50 INJECTION, SOLUTION INTRAVENOUS at 06:25

## 2025-07-18 RX ADMIN — ATORVASTATIN CALCIUM 80 MILLIGRAM(S): 80 TABLET, FILM COATED ORAL at 22:10

## 2025-07-18 RX ADMIN — Medication 0.12 MILLIGRAM(S): at 22:09

## 2025-07-18 RX ADMIN — Medication 50 MILLIGRAM(S): at 06:26

## 2025-07-18 RX ADMIN — HEPARIN SODIUM 5000 UNIT(S): 1000 INJECTION INTRAVENOUS; SUBCUTANEOUS at 06:24

## 2025-07-18 RX ADMIN — Medication 20 MILLIGRAM(S): at 06:28

## 2025-07-18 RX ADMIN — Medication 5 MILLIGRAM(S): at 22:09

## 2025-07-18 RX ADMIN — HEPARIN SODIUM 5000 UNIT(S): 1000 INJECTION INTRAVENOUS; SUBCUTANEOUS at 22:09

## 2025-07-18 RX ADMIN — Medication 50 MILLIGRAM(S): at 22:09

## 2025-07-18 RX ADMIN — Medication 40 MILLIGRAM(S): at 06:26

## 2025-07-18 RX ADMIN — Medication 50 MILLIGRAM(S): at 06:28

## 2025-07-18 RX ADMIN — Medication 0.12 MILLIGRAM(S): at 12:39

## 2025-07-18 RX ADMIN — Medication 50 MILLIGRAM(S): at 12:39

## 2025-07-18 RX ADMIN — AMIODARONE HYDROCHLORIDE 400 MILLIGRAM(S): 50 INJECTION, SOLUTION INTRAVENOUS at 12:38

## 2025-07-18 RX ADMIN — Medication 50 MILLIGRAM(S): at 22:10

## 2025-07-18 RX ADMIN — Medication 81 MILLIGRAM(S): at 12:38

## 2025-07-19 LAB
ANION GAP SERPL CALC-SCNC: 15 MMOL/L — SIGNIFICANT CHANGE UP (ref 5–17)
BASOPHILS # BLD AUTO: 0.1 K/UL — SIGNIFICANT CHANGE UP (ref 0–0.2)
BASOPHILS NFR BLD AUTO: 0.7 % — SIGNIFICANT CHANGE UP (ref 0–2)
BUN SERPL-MCNC: 24 MG/DL — HIGH (ref 7–23)
CALCIUM SERPL-MCNC: 9.8 MG/DL — SIGNIFICANT CHANGE UP (ref 8.4–10.5)
CHLORIDE SERPL-SCNC: 106 MMOL/L — SIGNIFICANT CHANGE UP (ref 96–108)
CO2 SERPL-SCNC: 20 MMOL/L — LOW (ref 22–31)
CREAT SERPL-MCNC: 1.24 MG/DL — SIGNIFICANT CHANGE UP (ref 0.5–1.3)
EGFR: 62 ML/MIN/1.73M2 — SIGNIFICANT CHANGE UP
EGFR: 62 ML/MIN/1.73M2 — SIGNIFICANT CHANGE UP
EOSINOPHIL # BLD AUTO: 0.28 K/UL — SIGNIFICANT CHANGE UP (ref 0–0.5)
EOSINOPHIL NFR BLD AUTO: 1.8 % — SIGNIFICANT CHANGE UP (ref 0–6)
GLUCOSE SERPL-MCNC: 111 MG/DL — HIGH (ref 70–99)
HCT VFR BLD CALC: 31.9 % — LOW (ref 39–50)
HGB BLD-MCNC: 10.3 G/DL — LOW (ref 13–17)
IMM GRANULOCYTES # BLD AUTO: 0.33 K/UL — HIGH (ref 0–0.07)
IMM GRANULOCYTES NFR BLD AUTO: 2.2 % — HIGH (ref 0–0.9)
LYMPHOCYTES # BLD AUTO: 2.26 K/UL — SIGNIFICANT CHANGE UP (ref 1–3.3)
LYMPHOCYTES NFR BLD AUTO: 14.9 % — SIGNIFICANT CHANGE UP (ref 13–44)
MAGNESIUM SERPL-MCNC: 2.3 MG/DL — SIGNIFICANT CHANGE UP (ref 1.6–2.6)
MCHC RBC-ENTMCNC: 29.9 PG — SIGNIFICANT CHANGE UP (ref 27–34)
MCHC RBC-ENTMCNC: 32.3 G/DL — SIGNIFICANT CHANGE UP (ref 32–36)
MCV RBC AUTO: 92.5 FL — SIGNIFICANT CHANGE UP (ref 80–100)
MONOCYTES # BLD AUTO: 1.26 K/UL — HIGH (ref 0–0.9)
MONOCYTES NFR BLD AUTO: 8.3 % — SIGNIFICANT CHANGE UP (ref 2–14)
NEUTROPHILS # BLD AUTO: 10.94 K/UL — HIGH (ref 1.8–7.4)
NEUTROPHILS NFR BLD AUTO: 72.1 % — SIGNIFICANT CHANGE UP (ref 43–77)
NRBC # BLD AUTO: 0 K/UL — SIGNIFICANT CHANGE UP (ref 0–0)
NRBC # FLD: 0 K/UL — SIGNIFICANT CHANGE UP (ref 0–0)
NRBC BLD AUTO-RTO: 0 /100 WBCS — SIGNIFICANT CHANGE UP (ref 0–0)
PHOSPHATE SERPL-MCNC: 4 MG/DL — SIGNIFICANT CHANGE UP (ref 2.5–4.5)
PLATELET # BLD AUTO: 320 K/UL — SIGNIFICANT CHANGE UP (ref 150–400)
PMV BLD: 10 FL — SIGNIFICANT CHANGE UP (ref 7–13)
POTASSIUM SERPL-MCNC: 4 MMOL/L — SIGNIFICANT CHANGE UP (ref 3.5–5.3)
POTASSIUM SERPL-SCNC: 4 MMOL/L — SIGNIFICANT CHANGE UP (ref 3.5–5.3)
RBC # BLD: 3.45 M/UL — LOW (ref 4.2–5.8)
RBC # FLD: 15.4 % — HIGH (ref 10.3–14.5)
SODIUM SERPL-SCNC: 141 MMOL/L — SIGNIFICANT CHANGE UP (ref 135–145)
WBC # BLD: 15.17 K/UL — HIGH (ref 3.8–10.5)
WBC # FLD AUTO: 15.17 K/UL — HIGH (ref 3.8–10.5)

## 2025-07-19 PROCEDURE — 71046 X-RAY EXAM CHEST 2 VIEWS: CPT | Mod: 26

## 2025-07-19 RX ORDER — SILVER SULFADIAZINE 1 %
1 CREAM (GRAM) TOPICAL DAILY
Refills: 0 | Status: DISCONTINUED | OUTPATIENT
Start: 2025-07-19 | End: 2025-07-22

## 2025-07-19 RX ADMIN — AMIODARONE HYDROCHLORIDE 400 MILLIGRAM(S): 50 INJECTION, SOLUTION INTRAVENOUS at 13:43

## 2025-07-19 RX ADMIN — ATORVASTATIN CALCIUM 80 MILLIGRAM(S): 80 TABLET, FILM COATED ORAL at 21:46

## 2025-07-19 RX ADMIN — Medication 1 APPLICATION(S): at 10:54

## 2025-07-19 RX ADMIN — Medication 3 MILLILITER(S): at 21:52

## 2025-07-19 RX ADMIN — AMLODIPINE BESYLATE 10 MILLIGRAM(S): 10 TABLET ORAL at 10:52

## 2025-07-19 RX ADMIN — Medication 50 MILLIGRAM(S): at 13:43

## 2025-07-19 RX ADMIN — Medication 40 MILLIGRAM(S): at 05:34

## 2025-07-19 RX ADMIN — Medication 50 MILLIGRAM(S): at 21:46

## 2025-07-19 RX ADMIN — Medication 0.12 MILLIGRAM(S): at 05:35

## 2025-07-19 RX ADMIN — METOPROLOL SUCCINATE 150 MILLIGRAM(S): 50 TABLET, EXTENDED RELEASE ORAL at 05:35

## 2025-07-19 RX ADMIN — Medication 50 MILLIGRAM(S): at 05:35

## 2025-07-19 RX ADMIN — HEPARIN SODIUM 5000 UNIT(S): 1000 INJECTION INTRAVENOUS; SUBCUTANEOUS at 13:42

## 2025-07-19 RX ADMIN — HEPARIN SODIUM 5000 UNIT(S): 1000 INJECTION INTRAVENOUS; SUBCUTANEOUS at 05:35

## 2025-07-19 RX ADMIN — Medication 81 MILLIGRAM(S): at 10:51

## 2025-07-19 RX ADMIN — Medication 0.12 MILLIGRAM(S): at 13:41

## 2025-07-19 RX ADMIN — Medication 5 MILLIGRAM(S): at 21:46

## 2025-07-19 RX ADMIN — Medication 1 APPLICATION(S): at 14:03

## 2025-07-19 RX ADMIN — Medication 100 MILLIGRAM(S): at 13:43

## 2025-07-19 RX ADMIN — HEPARIN SODIUM 5000 UNIT(S): 1000 INJECTION INTRAVENOUS; SUBCUTANEOUS at 21:45

## 2025-07-19 RX ADMIN — Medication 50 MILLIGRAM(S): at 05:34

## 2025-07-19 RX ADMIN — Medication 20 MILLIGRAM(S): at 05:35

## 2025-07-19 RX ADMIN — Medication 0.1 MILLIGRAM(S): at 17:04

## 2025-07-19 RX ADMIN — Medication 0.1 MILLIGRAM(S): at 05:34

## 2025-07-19 RX ADMIN — Medication 0.12 MILLIGRAM(S): at 21:46

## 2025-07-19 RX ADMIN — AMIODARONE HYDROCHLORIDE 400 MILLIGRAM(S): 50 INJECTION, SOLUTION INTRAVENOUS at 05:35

## 2025-07-19 RX ADMIN — Medication 3 MILLILITER(S): at 05:45

## 2025-07-19 RX ADMIN — Medication 3 MILLILITER(S): at 14:30

## 2025-07-20 LAB
ALBUMIN SERPL ELPH-MCNC: 3.6 G/DL — SIGNIFICANT CHANGE UP (ref 3.3–5)
ALP SERPL-CCNC: 67 U/L — SIGNIFICANT CHANGE UP (ref 40–120)
ALT FLD-CCNC: 44 U/L — SIGNIFICANT CHANGE UP (ref 10–45)
ANION GAP SERPL CALC-SCNC: 15 MMOL/L — SIGNIFICANT CHANGE UP (ref 5–17)
AST SERPL-CCNC: 32 U/L — SIGNIFICANT CHANGE UP (ref 10–40)
BASOPHILS # BLD AUTO: 0.1 K/UL — SIGNIFICANT CHANGE UP (ref 0–0.2)
BASOPHILS NFR BLD AUTO: 0.7 % — SIGNIFICANT CHANGE UP (ref 0–2)
BILIRUB SERPL-MCNC: 0.9 MG/DL — SIGNIFICANT CHANGE UP (ref 0.2–1.2)
BUN SERPL-MCNC: 25 MG/DL — HIGH (ref 7–23)
CALCIUM SERPL-MCNC: 9.9 MG/DL — SIGNIFICANT CHANGE UP (ref 8.4–10.5)
CHLORIDE SERPL-SCNC: 104 MMOL/L — SIGNIFICANT CHANGE UP (ref 96–108)
CO2 SERPL-SCNC: 21 MMOL/L — LOW (ref 22–31)
CREAT SERPL-MCNC: 1.26 MG/DL — SIGNIFICANT CHANGE UP (ref 0.5–1.3)
EGFR: 61 ML/MIN/1.73M2 — SIGNIFICANT CHANGE UP
EGFR: 61 ML/MIN/1.73M2 — SIGNIFICANT CHANGE UP
EOSINOPHIL # BLD AUTO: 0.34 K/UL — SIGNIFICANT CHANGE UP (ref 0–0.5)
EOSINOPHIL NFR BLD AUTO: 2.2 % — SIGNIFICANT CHANGE UP (ref 0–6)
GLUCOSE SERPL-MCNC: 92 MG/DL — SIGNIFICANT CHANGE UP (ref 70–99)
HCT VFR BLD CALC: 30.5 % — LOW (ref 39–50)
HGB BLD-MCNC: 10.1 G/DL — LOW (ref 13–17)
IMM GRANULOCYTES # BLD AUTO: 0.26 K/UL — HIGH (ref 0–0.07)
IMM GRANULOCYTES NFR BLD AUTO: 1.7 % — HIGH (ref 0–0.9)
LYMPHOCYTES # BLD AUTO: 2.43 K/UL — SIGNIFICANT CHANGE UP (ref 1–3.3)
LYMPHOCYTES NFR BLD AUTO: 16.1 % — SIGNIFICANT CHANGE UP (ref 13–44)
MAGNESIUM SERPL-MCNC: 2.3 MG/DL — SIGNIFICANT CHANGE UP (ref 1.6–2.6)
MCHC RBC-ENTMCNC: 30.1 PG — SIGNIFICANT CHANGE UP (ref 27–34)
MCHC RBC-ENTMCNC: 33.1 G/DL — SIGNIFICANT CHANGE UP (ref 32–36)
MCV RBC AUTO: 91 FL — SIGNIFICANT CHANGE UP (ref 80–100)
MONOCYTES # BLD AUTO: 1.2 K/UL — HIGH (ref 0–0.9)
MONOCYTES NFR BLD AUTO: 7.9 % — SIGNIFICANT CHANGE UP (ref 2–14)
NEUTROPHILS # BLD AUTO: 10.8 K/UL — HIGH (ref 1.8–7.4)
NEUTROPHILS NFR BLD AUTO: 71.4 % — SIGNIFICANT CHANGE UP (ref 43–77)
NRBC # BLD AUTO: 0 K/UL — SIGNIFICANT CHANGE UP (ref 0–0)
NRBC # FLD: 0 K/UL — SIGNIFICANT CHANGE UP (ref 0–0)
NRBC BLD AUTO-RTO: 0 /100 WBCS — SIGNIFICANT CHANGE UP (ref 0–0)
PHOSPHATE SERPL-MCNC: 3.9 MG/DL — SIGNIFICANT CHANGE UP (ref 2.5–4.5)
PLATELET # BLD AUTO: 289 K/UL — SIGNIFICANT CHANGE UP (ref 150–400)
PMV BLD: 10.3 FL — SIGNIFICANT CHANGE UP (ref 7–13)
POTASSIUM SERPL-MCNC: 4.3 MMOL/L — SIGNIFICANT CHANGE UP (ref 3.5–5.3)
POTASSIUM SERPL-SCNC: 4.3 MMOL/L — SIGNIFICANT CHANGE UP (ref 3.5–5.3)
PROT SERPL-MCNC: 6.4 G/DL — SIGNIFICANT CHANGE UP (ref 6–8.3)
RBC # BLD: 3.35 M/UL — LOW (ref 4.2–5.8)
RBC # FLD: 15.4 % — HIGH (ref 10.3–14.5)
SODIUM SERPL-SCNC: 140 MMOL/L — SIGNIFICANT CHANGE UP (ref 135–145)
WBC # BLD: 15.13 K/UL — HIGH (ref 3.8–10.5)
WBC # FLD AUTO: 15.13 K/UL — HIGH (ref 3.8–10.5)

## 2025-07-20 RX ADMIN — Medication 5 MILLIGRAM(S): at 21:07

## 2025-07-20 RX ADMIN — Medication 3 MILLILITER(S): at 15:21

## 2025-07-20 RX ADMIN — Medication 20 MILLIGRAM(S): at 05:28

## 2025-07-20 RX ADMIN — Medication 0.12 MILLIGRAM(S): at 21:07

## 2025-07-20 RX ADMIN — Medication 3 MILLILITER(S): at 05:22

## 2025-07-20 RX ADMIN — Medication 81 MILLIGRAM(S): at 11:11

## 2025-07-20 RX ADMIN — Medication 0.1 MILLIGRAM(S): at 05:28

## 2025-07-20 RX ADMIN — HEPARIN SODIUM 5000 UNIT(S): 1000 INJECTION INTRAVENOUS; SUBCUTANEOUS at 14:10

## 2025-07-20 RX ADMIN — ATORVASTATIN CALCIUM 80 MILLIGRAM(S): 80 TABLET, FILM COATED ORAL at 21:07

## 2025-07-20 RX ADMIN — Medication 40 MILLIGRAM(S): at 05:27

## 2025-07-20 RX ADMIN — Medication 0.1 MILLIGRAM(S): at 17:20

## 2025-07-20 RX ADMIN — HEPARIN SODIUM 5000 UNIT(S): 1000 INJECTION INTRAVENOUS; SUBCUTANEOUS at 05:27

## 2025-07-20 RX ADMIN — Medication 1 APPLICATION(S): at 11:12

## 2025-07-20 RX ADMIN — Medication 50 MILLIGRAM(S): at 21:08

## 2025-07-20 RX ADMIN — Medication 50 MILLIGRAM(S): at 05:28

## 2025-07-20 RX ADMIN — METOPROLOL SUCCINATE 150 MILLIGRAM(S): 50 TABLET, EXTENDED RELEASE ORAL at 05:28

## 2025-07-20 RX ADMIN — AMLODIPINE BESYLATE 10 MILLIGRAM(S): 10 TABLET ORAL at 05:27

## 2025-07-20 RX ADMIN — Medication 50 MILLIGRAM(S): at 21:07

## 2025-07-20 RX ADMIN — HEPARIN SODIUM 5000 UNIT(S): 1000 INJECTION INTRAVENOUS; SUBCUTANEOUS at 21:08

## 2025-07-20 RX ADMIN — Medication 100 MILLIGRAM(S): at 14:14

## 2025-07-20 RX ADMIN — Medication 3 MILLILITER(S): at 21:21

## 2025-07-20 RX ADMIN — Medication 50 MILLIGRAM(S): at 14:11

## 2025-07-20 RX ADMIN — Medication 0.12 MILLIGRAM(S): at 05:28

## 2025-07-20 RX ADMIN — AMIODARONE HYDROCHLORIDE 200 MILLIGRAM(S): 50 INJECTION, SOLUTION INTRAVENOUS at 05:27

## 2025-07-20 RX ADMIN — Medication 1 APPLICATION(S): at 11:11

## 2025-07-20 RX ADMIN — Medication 0.12 MILLIGRAM(S): at 14:16

## 2025-07-21 LAB
ANION GAP SERPL CALC-SCNC: 12 MMOL/L — SIGNIFICANT CHANGE UP (ref 5–17)
BASOPHILS # BLD AUTO: 0.1 K/UL — SIGNIFICANT CHANGE UP (ref 0–0.2)
BASOPHILS NFR BLD AUTO: 0.7 % — SIGNIFICANT CHANGE UP (ref 0–2)
BUN SERPL-MCNC: 28 MG/DL — HIGH (ref 7–23)
CALCIUM SERPL-MCNC: 9.6 MG/DL — SIGNIFICANT CHANGE UP (ref 8.4–10.5)
CHLORIDE SERPL-SCNC: 103 MMOL/L — SIGNIFICANT CHANGE UP (ref 96–108)
CO2 SERPL-SCNC: 22 MMOL/L — SIGNIFICANT CHANGE UP (ref 22–31)
CREAT SERPL-MCNC: 1.43 MG/DL — HIGH (ref 0.5–1.3)
EGFR: 52 ML/MIN/1.73M2 — LOW
EGFR: 52 ML/MIN/1.73M2 — LOW
EOSINOPHIL # BLD AUTO: 0.28 K/UL — SIGNIFICANT CHANGE UP (ref 0–0.5)
EOSINOPHIL NFR BLD AUTO: 1.9 % — SIGNIFICANT CHANGE UP (ref 0–6)
GLUCOSE SERPL-MCNC: 96 MG/DL — SIGNIFICANT CHANGE UP (ref 70–99)
HCT VFR BLD CALC: 31.5 % — LOW (ref 39–50)
HGB BLD-MCNC: 10.4 G/DL — LOW (ref 13–17)
IMM GRANULOCYTES # BLD AUTO: 0.24 K/UL — HIGH (ref 0–0.07)
IMM GRANULOCYTES NFR BLD AUTO: 1.6 % — HIGH (ref 0–0.9)
LYMPHOCYTES # BLD AUTO: 2.53 K/UL — SIGNIFICANT CHANGE UP (ref 1–3.3)
LYMPHOCYTES NFR BLD AUTO: 17.2 % — SIGNIFICANT CHANGE UP (ref 13–44)
MAGNESIUM SERPL-MCNC: 2.2 MG/DL — SIGNIFICANT CHANGE UP (ref 1.6–2.6)
MCHC RBC-ENTMCNC: 30.1 PG — SIGNIFICANT CHANGE UP (ref 27–34)
MCHC RBC-ENTMCNC: 33 G/DL — SIGNIFICANT CHANGE UP (ref 32–36)
MCV RBC AUTO: 91 FL — SIGNIFICANT CHANGE UP (ref 80–100)
MONOCYTES # BLD AUTO: 1.16 K/UL — HIGH (ref 0–0.9)
MONOCYTES NFR BLD AUTO: 7.9 % — SIGNIFICANT CHANGE UP (ref 2–14)
NEUTROPHILS # BLD AUTO: 10.44 K/UL — HIGH (ref 1.8–7.4)
NEUTROPHILS NFR BLD AUTO: 70.7 % — SIGNIFICANT CHANGE UP (ref 43–77)
NRBC # BLD AUTO: 0 K/UL — SIGNIFICANT CHANGE UP (ref 0–0)
NRBC # FLD: 0 K/UL — SIGNIFICANT CHANGE UP (ref 0–0)
NRBC BLD AUTO-RTO: 0 /100 WBCS — SIGNIFICANT CHANGE UP (ref 0–0)
PHOSPHATE SERPL-MCNC: 3.9 MG/DL — SIGNIFICANT CHANGE UP (ref 2.5–4.5)
PLATELET # BLD AUTO: 241 K/UL — SIGNIFICANT CHANGE UP (ref 150–400)
PMV BLD: 9.9 FL — SIGNIFICANT CHANGE UP (ref 7–13)
POTASSIUM SERPL-MCNC: 4.4 MMOL/L — SIGNIFICANT CHANGE UP (ref 3.5–5.3)
POTASSIUM SERPL-SCNC: 4.4 MMOL/L — SIGNIFICANT CHANGE UP (ref 3.5–5.3)
RBC # BLD: 3.46 M/UL — LOW (ref 4.2–5.8)
RBC # FLD: 15.3 % — HIGH (ref 10.3–14.5)
SODIUM SERPL-SCNC: 137 MMOL/L — SIGNIFICANT CHANGE UP (ref 135–145)
WBC # BLD: 14.75 K/UL — HIGH (ref 3.8–10.5)
WBC # FLD AUTO: 14.75 K/UL — HIGH (ref 3.8–10.5)

## 2025-07-21 RX ADMIN — Medication 50 MILLIGRAM(S): at 13:24

## 2025-07-21 RX ADMIN — ATORVASTATIN CALCIUM 80 MILLIGRAM(S): 80 TABLET, FILM COATED ORAL at 21:32

## 2025-07-21 RX ADMIN — Medication 50 MILLIGRAM(S): at 06:23

## 2025-07-21 RX ADMIN — AMLODIPINE BESYLATE 10 MILLIGRAM(S): 10 TABLET ORAL at 06:23

## 2025-07-21 RX ADMIN — HEPARIN SODIUM 5000 UNIT(S): 1000 INJECTION INTRAVENOUS; SUBCUTANEOUS at 21:33

## 2025-07-21 RX ADMIN — Medication 3 MILLILITER(S): at 21:38

## 2025-07-21 RX ADMIN — Medication 3 MILLILITER(S): at 13:43

## 2025-07-21 RX ADMIN — Medication 81 MILLIGRAM(S): at 11:04

## 2025-07-21 RX ADMIN — Medication 50 MILLIGRAM(S): at 21:32

## 2025-07-21 RX ADMIN — HEPARIN SODIUM 5000 UNIT(S): 1000 INJECTION INTRAVENOUS; SUBCUTANEOUS at 06:24

## 2025-07-21 RX ADMIN — Medication 1 APPLICATION(S): at 11:04

## 2025-07-21 RX ADMIN — AMIODARONE HYDROCHLORIDE 200 MILLIGRAM(S): 50 INJECTION, SOLUTION INTRAVENOUS at 06:23

## 2025-07-21 RX ADMIN — Medication 40 MILLIGRAM(S): at 06:23

## 2025-07-21 RX ADMIN — Medication 1 APPLICATION(S): at 11:03

## 2025-07-21 RX ADMIN — HEPARIN SODIUM 5000 UNIT(S): 1000 INJECTION INTRAVENOUS; SUBCUTANEOUS at 13:24

## 2025-07-21 RX ADMIN — Medication 20 MILLIGRAM(S): at 06:23

## 2025-07-21 RX ADMIN — Medication 3 MILLILITER(S): at 06:47

## 2025-07-21 RX ADMIN — METOPROLOL SUCCINATE 150 MILLIGRAM(S): 50 TABLET, EXTENDED RELEASE ORAL at 06:48

## 2025-07-21 RX ADMIN — Medication 0.12 MILLIGRAM(S): at 21:32

## 2025-07-21 RX ADMIN — Medication 0.1 MILLIGRAM(S): at 06:22

## 2025-07-21 RX ADMIN — Medication 0.1 MILLIGRAM(S): at 17:42

## 2025-07-21 RX ADMIN — Medication 0.12 MILLIGRAM(S): at 06:22

## 2025-07-21 RX ADMIN — Medication 5 MILLIGRAM(S): at 21:32

## 2025-07-22 ENCOUNTER — TRANSCRIPTION ENCOUNTER (OUTPATIENT)
Age: 70
End: 2025-07-22

## 2025-07-22 VITALS
SYSTOLIC BLOOD PRESSURE: 168 MMHG | RESPIRATION RATE: 18 BRPM | DIASTOLIC BLOOD PRESSURE: 71 MMHG | OXYGEN SATURATION: 96 % | HEART RATE: 75 BPM

## 2025-07-22 LAB
ANION GAP SERPL CALC-SCNC: 14 MMOL/L — SIGNIFICANT CHANGE UP (ref 5–17)
APPEARANCE UR: CLEAR — SIGNIFICANT CHANGE UP
BASOPHILS # BLD AUTO: 0.11 K/UL — SIGNIFICANT CHANGE UP (ref 0–0.2)
BASOPHILS NFR BLD AUTO: 0.7 % — SIGNIFICANT CHANGE UP (ref 0–2)
BILIRUB UR-MCNC: NEGATIVE — SIGNIFICANT CHANGE UP
BUN SERPL-MCNC: 29 MG/DL — HIGH (ref 7–23)
CALCIUM SERPL-MCNC: 10 MG/DL — SIGNIFICANT CHANGE UP (ref 8.4–10.5)
CHLORIDE SERPL-SCNC: 102 MMOL/L — SIGNIFICANT CHANGE UP (ref 96–108)
CO2 SERPL-SCNC: 21 MMOL/L — LOW (ref 22–31)
COLOR SPEC: YELLOW — SIGNIFICANT CHANGE UP
CREAT SERPL-MCNC: 1.42 MG/DL — HIGH (ref 0.5–1.3)
DIFF PNL FLD: NEGATIVE — SIGNIFICANT CHANGE UP
EGFR: 53 ML/MIN/1.73M2 — LOW
EGFR: 53 ML/MIN/1.73M2 — LOW
EOSINOPHIL # BLD AUTO: 0.25 K/UL — SIGNIFICANT CHANGE UP (ref 0–0.5)
EOSINOPHIL NFR BLD AUTO: 1.7 % — SIGNIFICANT CHANGE UP (ref 0–6)
GLUCOSE SERPL-MCNC: 96 MG/DL — SIGNIFICANT CHANGE UP (ref 70–99)
GLUCOSE UR QL: NEGATIVE MG/DL — SIGNIFICANT CHANGE UP
HCT VFR BLD CALC: 33.1 % — LOW (ref 39–50)
HGB BLD-MCNC: 10.8 G/DL — LOW (ref 13–17)
IMM GRANULOCYTES # BLD AUTO: 0.2 K/UL — HIGH (ref 0–0.07)
IMM GRANULOCYTES NFR BLD AUTO: 1.4 % — HIGH (ref 0–0.9)
KETONES UR QL: NEGATIVE MG/DL — SIGNIFICANT CHANGE UP
LEUKOCYTE ESTERASE UR-ACNC: NEGATIVE — SIGNIFICANT CHANGE UP
LYMPHOCYTES # BLD AUTO: 2.54 K/UL — SIGNIFICANT CHANGE UP (ref 1–3.3)
LYMPHOCYTES NFR BLD AUTO: 17.2 % — SIGNIFICANT CHANGE UP (ref 13–44)
MAGNESIUM SERPL-MCNC: 2.3 MG/DL — SIGNIFICANT CHANGE UP (ref 1.6–2.6)
MCHC RBC-ENTMCNC: 29.8 PG — SIGNIFICANT CHANGE UP (ref 27–34)
MCHC RBC-ENTMCNC: 32.6 G/DL — SIGNIFICANT CHANGE UP (ref 32–36)
MCV RBC AUTO: 91.2 FL — SIGNIFICANT CHANGE UP (ref 80–100)
MONOCYTES # BLD AUTO: 1.15 K/UL — HIGH (ref 0–0.9)
MONOCYTES NFR BLD AUTO: 7.8 % — SIGNIFICANT CHANGE UP (ref 2–14)
NEUTROPHILS # BLD AUTO: 10.56 K/UL — HIGH (ref 1.8–7.4)
NEUTROPHILS NFR BLD AUTO: 71.2 % — SIGNIFICANT CHANGE UP (ref 43–77)
NITRITE UR-MCNC: NEGATIVE — SIGNIFICANT CHANGE UP
NRBC # BLD AUTO: 0 K/UL — SIGNIFICANT CHANGE UP (ref 0–0)
NRBC # FLD: 0 K/UL — SIGNIFICANT CHANGE UP (ref 0–0)
NRBC BLD AUTO-RTO: 0 /100 WBCS — SIGNIFICANT CHANGE UP (ref 0–0)
PH UR: 7.5 — SIGNIFICANT CHANGE UP (ref 5–8)
PHOSPHATE SERPL-MCNC: 3.9 MG/DL — SIGNIFICANT CHANGE UP (ref 2.5–4.5)
PLATELET # BLD AUTO: 256 K/UL — SIGNIFICANT CHANGE UP (ref 150–400)
PMV BLD: 10.1 FL — SIGNIFICANT CHANGE UP (ref 7–13)
POTASSIUM SERPL-MCNC: 4.6 MMOL/L — SIGNIFICANT CHANGE UP (ref 3.5–5.3)
POTASSIUM SERPL-SCNC: 4.6 MMOL/L — SIGNIFICANT CHANGE UP (ref 3.5–5.3)
PROT UR-MCNC: NEGATIVE MG/DL — SIGNIFICANT CHANGE UP
RBC # BLD: 3.63 M/UL — LOW (ref 4.2–5.8)
RBC # FLD: 15 % — HIGH (ref 10.3–14.5)
SODIUM SERPL-SCNC: 137 MMOL/L — SIGNIFICANT CHANGE UP (ref 135–145)
SP GR SPEC: 1.01 — SIGNIFICANT CHANGE UP (ref 1–1.03)
UROBILINOGEN FLD QL: 0.2 MG/DL — SIGNIFICANT CHANGE UP (ref 0.2–1)
WBC # BLD: 14.81 K/UL — HIGH (ref 3.8–10.5)
WBC # FLD AUTO: 14.81 K/UL — HIGH (ref 3.8–10.5)

## 2025-07-22 PROCEDURE — 97162 PT EVAL MOD COMPLEX 30 MIN: CPT

## 2025-07-22 PROCEDURE — 84132 ASSAY OF SERUM POTASSIUM: CPT

## 2025-07-22 PROCEDURE — C1889: CPT

## 2025-07-22 PROCEDURE — 86900 BLOOD TYPING SEROLOGIC ABO: CPT

## 2025-07-22 PROCEDURE — 80053 COMPREHEN METABOLIC PANEL: CPT

## 2025-07-22 PROCEDURE — 97166 OT EVAL MOD COMPLEX 45 MIN: CPT

## 2025-07-22 PROCEDURE — 86901 BLOOD TYPING SEROLOGIC RH(D): CPT

## 2025-07-22 PROCEDURE — 97530 THERAPEUTIC ACTIVITIES: CPT

## 2025-07-22 PROCEDURE — P9047: CPT

## 2025-07-22 PROCEDURE — 82553 CREATINE MB FRACTION: CPT

## 2025-07-22 PROCEDURE — 83735 ASSAY OF MAGNESIUM: CPT

## 2025-07-22 PROCEDURE — 85730 THROMBOPLASTIN TIME PARTIAL: CPT

## 2025-07-22 PROCEDURE — 93320 DOPPLER ECHO COMPLETE: CPT

## 2025-07-22 PROCEDURE — 36415 COLL VENOUS BLD VENIPUNCTURE: CPT

## 2025-07-22 PROCEDURE — P9045: CPT

## 2025-07-22 PROCEDURE — 85396 CLOTTING ASSAY WHOLE BLOOD: CPT

## 2025-07-22 PROCEDURE — 82962 GLUCOSE BLOOD TEST: CPT

## 2025-07-22 PROCEDURE — 85384 FIBRINOGEN ACTIVITY: CPT

## 2025-07-22 PROCEDURE — 85018 HEMOGLOBIN: CPT

## 2025-07-22 PROCEDURE — 85520 HEPARIN ASSAY: CPT

## 2025-07-22 PROCEDURE — C1769: CPT

## 2025-07-22 PROCEDURE — 87449 NOS EACH ORGANISM AG IA: CPT

## 2025-07-22 PROCEDURE — 85025 COMPLETE CBC W/AUTO DIFF WBC: CPT

## 2025-07-22 PROCEDURE — 84436 ASSAY OF TOTAL THYROXINE: CPT

## 2025-07-22 PROCEDURE — 84439 ASSAY OF FREE THYROXINE: CPT

## 2025-07-22 PROCEDURE — 80076 HEPATIC FUNCTION PANEL: CPT

## 2025-07-22 PROCEDURE — 94002 VENT MGMT INPAT INIT DAY: CPT

## 2025-07-22 PROCEDURE — C1751: CPT

## 2025-07-22 PROCEDURE — 84295 ASSAY OF SERUM SODIUM: CPT

## 2025-07-22 PROCEDURE — P9073: CPT

## 2025-07-22 PROCEDURE — P9012: CPT

## 2025-07-22 PROCEDURE — 82435 ASSAY OF BLOOD CHLORIDE: CPT

## 2025-07-22 PROCEDURE — 71045 X-RAY EXAM CHEST 1 VIEW: CPT

## 2025-07-22 PROCEDURE — 86965 POOLING BLOOD PLATELETS: CPT

## 2025-07-22 PROCEDURE — 93325 DOPPLER ECHO COLOR FLOW MAPG: CPT

## 2025-07-22 PROCEDURE — 82550 ASSAY OF CK (CPK): CPT

## 2025-07-22 PROCEDURE — 85014 HEMATOCRIT: CPT

## 2025-07-22 PROCEDURE — 93005 ELECTROCARDIOGRAM TRACING: CPT

## 2025-07-22 PROCEDURE — 85027 COMPLETE CBC AUTOMATED: CPT

## 2025-07-22 PROCEDURE — 87324 CLOSTRIDIUM AG IA: CPT

## 2025-07-22 PROCEDURE — 86850 RBC ANTIBODY SCREEN: CPT

## 2025-07-22 PROCEDURE — 84480 ASSAY TRIIODOTHYRONINE (T3): CPT

## 2025-07-22 PROCEDURE — 85610 PROTHROMBIN TIME: CPT

## 2025-07-22 PROCEDURE — P9016: CPT

## 2025-07-22 PROCEDURE — 93010 ELECTROCARDIOGRAM REPORT: CPT

## 2025-07-22 PROCEDURE — 81003 URINALYSIS AUTO W/O SCOPE: CPT

## 2025-07-22 PROCEDURE — 83605 ASSAY OF LACTIC ACID: CPT

## 2025-07-22 PROCEDURE — 82947 ASSAY GLUCOSE BLOOD QUANT: CPT

## 2025-07-22 PROCEDURE — 86891 AUTOLOGOUS BLOOD OP SALVAGE: CPT

## 2025-07-22 PROCEDURE — 97116 GAIT TRAINING THERAPY: CPT

## 2025-07-22 PROCEDURE — 82803 BLOOD GASES ANY COMBINATION: CPT

## 2025-07-22 PROCEDURE — 80048 BASIC METABOLIC PNL TOTAL CA: CPT

## 2025-07-22 PROCEDURE — 82330 ASSAY OF CALCIUM: CPT

## 2025-07-22 PROCEDURE — 84443 ASSAY THYROID STIM HORMONE: CPT

## 2025-07-22 PROCEDURE — 84484 ASSAY OF TROPONIN QUANT: CPT

## 2025-07-22 PROCEDURE — 71046 X-RAY EXAM CHEST 2 VIEWS: CPT

## 2025-07-22 PROCEDURE — 86923 COMPATIBILITY TEST ELECTRIC: CPT

## 2025-07-22 PROCEDURE — 84100 ASSAY OF PHOSPHORUS: CPT

## 2025-07-22 PROCEDURE — 97535 SELF CARE MNGMENT TRAINING: CPT

## 2025-07-22 PROCEDURE — 94660 CPAP INITIATION&MGMT: CPT

## 2025-07-22 PROCEDURE — P9059: CPT

## 2025-07-22 PROCEDURE — 31720 CLEARANCE OF AIRWAYS: CPT

## 2025-07-22 RX ORDER — SPIRONOLACTONE 25 MG
2 TABLET ORAL
Qty: 60 | Refills: 0
Start: 2025-07-22 | End: 2025-08-20

## 2025-07-22 RX ORDER — SILVER SULFADIAZINE 1 %
1 CREAM (GRAM) TOPICAL
Qty: 1 | Refills: 0
Start: 2025-07-22 | End: 2025-07-28

## 2025-07-22 RX ORDER — LOSARTAN POTASSIUM AND HYDROCHLOROTHIAZIDE 12.5; 5 MG/1; MG/1
1 TABLET ORAL
Refills: 0 | DISCHARGE

## 2025-07-22 RX ORDER — ATORVASTATIN CALCIUM 80 MG/1
1 TABLET, FILM COATED ORAL
Qty: 30 | Refills: 0
Start: 2025-07-22 | End: 2025-08-20

## 2025-07-22 RX ORDER — AMIODARONE HYDROCHLORIDE 50 MG/ML
1 INJECTION, SOLUTION INTRAVENOUS
Qty: 30 | Refills: 0
Start: 2025-07-22 | End: 2025-08-20

## 2025-07-22 RX ORDER — ATORVASTATIN CALCIUM 80 MG/1
1 TABLET, FILM COATED ORAL
Refills: 0 | DISCHARGE

## 2025-07-22 RX ORDER — SPIRONOLACTONE 25 MG
0 TABLET ORAL
Refills: 0 | DISCHARGE

## 2025-07-22 RX ORDER — B1/B2/B3/B5/B6/B12/VIT C/FOLIC 500-0.5 MG
1 TABLET ORAL
Refills: 0 | DISCHARGE

## 2025-07-22 RX ORDER — TORSEMIDE 10 MG
1 TABLET ORAL
Qty: 30 | Refills: 0
Start: 2025-07-22 | End: 2025-08-20

## 2025-07-22 RX ORDER — METOPROLOL SUCCINATE 50 MG/1
1 TABLET, EXTENDED RELEASE ORAL
Qty: 60 | Refills: 0
Start: 2025-07-22 | End: 2025-08-20

## 2025-07-22 RX ADMIN — Medication 50 MILLIGRAM(S): at 05:35

## 2025-07-22 RX ADMIN — Medication 20 MILLIGRAM(S): at 05:36

## 2025-07-22 RX ADMIN — Medication 3 MILLILITER(S): at 05:58

## 2025-07-22 RX ADMIN — Medication 50 MILLIGRAM(S): at 13:35

## 2025-07-22 RX ADMIN — Medication 0.12 MILLIGRAM(S): at 13:33

## 2025-07-22 RX ADMIN — Medication 0.1 MILLIGRAM(S): at 05:35

## 2025-07-22 RX ADMIN — Medication 40 MILLIGRAM(S): at 05:35

## 2025-07-22 RX ADMIN — AMLODIPINE BESYLATE 10 MILLIGRAM(S): 10 TABLET ORAL at 05:35

## 2025-07-22 RX ADMIN — HEPARIN SODIUM 5000 UNIT(S): 1000 INJECTION INTRAVENOUS; SUBCUTANEOUS at 13:34

## 2025-07-22 RX ADMIN — Medication 50 MILLIGRAM(S): at 13:34

## 2025-07-22 RX ADMIN — Medication 50 MILLIGRAM(S): at 05:36

## 2025-07-22 RX ADMIN — AMIODARONE HYDROCHLORIDE 200 MILLIGRAM(S): 50 INJECTION, SOLUTION INTRAVENOUS at 05:37

## 2025-07-22 RX ADMIN — METOPROLOL SUCCINATE 150 MILLIGRAM(S): 50 TABLET, EXTENDED RELEASE ORAL at 13:33

## 2025-07-22 RX ADMIN — Medication 1 APPLICATION(S): at 16:29

## 2025-07-22 RX ADMIN — Medication 81 MILLIGRAM(S): at 13:34

## 2025-07-22 RX ADMIN — HEPARIN SODIUM 5000 UNIT(S): 1000 INJECTION INTRAVENOUS; SUBCUTANEOUS at 05:36

## 2025-07-22 RX ADMIN — Medication 1 APPLICATION(S): at 13:18

## 2025-07-22 RX ADMIN — Medication 3 MILLILITER(S): at 13:38

## 2025-07-22 RX ADMIN — Medication 0.12 MILLIGRAM(S): at 05:35

## 2025-07-23 RX ORDER — METOPROLOL SUCCINATE 100 MG/1
100 TABLET, EXTENDED RELEASE ORAL
Refills: 0 | Status: ACTIVE | COMMUNITY
Start: 2025-07-23

## 2025-07-23 RX ORDER — AMIODARONE HYDROCHLORIDE 200 MG/1
200 TABLET ORAL DAILY
Refills: 0 | Status: ACTIVE | COMMUNITY
Start: 2025-07-23

## 2025-07-23 RX ORDER — CLONIDINE HYDROCHLORIDE 0.1 MG/1
0.1 TABLET ORAL TWICE DAILY
Refills: 0 | Status: ACTIVE | COMMUNITY
Start: 2025-07-23

## 2025-07-24 ENCOUNTER — APPOINTMENT (OUTPATIENT)
Dept: CARE COORDINATION | Facility: HOME HEALTH | Age: 70
End: 2025-07-24
Payer: MEDICARE

## 2025-07-24 VITALS
RESPIRATION RATE: 16 BRPM | OXYGEN SATURATION: 96 % | SYSTOLIC BLOOD PRESSURE: 132 MMHG | WEIGHT: 194 LBS | HEART RATE: 60 BPM | BODY MASS INDEX: 32.28 KG/M2 | DIASTOLIC BLOOD PRESSURE: 78 MMHG

## 2025-07-24 PROCEDURE — 99024 POSTOP FOLLOW-UP VISIT: CPT

## 2025-07-25 RX ORDER — MELATONIN 5 MG
1 TABLET ORAL
Refills: 0 | DISCHARGE

## 2025-07-28 ENCOUNTER — APPOINTMENT (OUTPATIENT)
Dept: CARDIOTHORACIC SURGERY | Facility: CLINIC | Age: 70
End: 2025-07-28
Payer: MEDICARE

## 2025-07-28 VITALS
RESPIRATION RATE: 16 BRPM | HEIGHT: 65 IN | OXYGEN SATURATION: 96 % | DIASTOLIC BLOOD PRESSURE: 66 MMHG | SYSTOLIC BLOOD PRESSURE: 132 MMHG | HEART RATE: 61 BPM | TEMPERATURE: 97.5 F

## 2025-07-28 PROCEDURE — 99024 POSTOP FOLLOW-UP VISIT: CPT

## 2025-07-28 RX ORDER — DOXYCYCLINE HYCLATE 100 MG/1
100 CAPSULE ORAL
Qty: 28 | Refills: 0 | Status: ACTIVE | COMMUNITY
Start: 2025-07-28 | End: 1900-01-01

## 2025-07-31 PROBLEM — Z95.1 S/P CABG X 4: Status: ACTIVE | Noted: 2025-07-24

## 2025-08-04 ENCOUNTER — APPOINTMENT (OUTPATIENT)
Dept: CARDIOTHORACIC SURGERY | Facility: CLINIC | Age: 70
End: 2025-08-04
Payer: MEDICARE

## 2025-08-04 VITALS
HEART RATE: 57 BPM | DIASTOLIC BLOOD PRESSURE: 73 MMHG | RESPIRATION RATE: 16 BRPM | TEMPERATURE: 97.5 F | WEIGHT: 187 LBS | BODY MASS INDEX: 31.16 KG/M2 | HEIGHT: 65 IN | OXYGEN SATURATION: 96 % | SYSTOLIC BLOOD PRESSURE: 136 MMHG

## 2025-08-04 DIAGNOSIS — Z95.1 PRESENCE OF AORTOCORONARY BYPASS GRAFT: ICD-10-CM

## 2025-08-04 PROCEDURE — 99024 POSTOP FOLLOW-UP VISIT: CPT

## 2025-08-04 RX ORDER — TORSEMIDE 20 MG/1
20 TABLET ORAL DAILY
Qty: 15 | Refills: 0 | Status: COMPLETED | COMMUNITY
Start: 2025-07-23 | End: 2025-08-04

## 2025-08-05 ENCOUNTER — NON-APPOINTMENT (OUTPATIENT)
Age: 70
End: 2025-08-05

## 2025-08-06 ENCOUNTER — RESULT REVIEW (OUTPATIENT)
Age: 70
End: 2025-08-06

## 2025-08-06 ENCOUNTER — APPOINTMENT (OUTPATIENT)
Dept: OTOLARYNGOLOGY | Facility: CLINIC | Age: 70
End: 2025-08-06

## 2025-08-06 VITALS
HEIGHT: 65 IN | BODY MASS INDEX: 31.16 KG/M2 | HEART RATE: 98 BPM | DIASTOLIC BLOOD PRESSURE: 74 MMHG | SYSTOLIC BLOOD PRESSURE: 160 MMHG | WEIGHT: 187 LBS

## 2025-08-06 DIAGNOSIS — G50.1 ATYPICAL FACIAL PAIN: ICD-10-CM

## 2025-08-06 DIAGNOSIS — J34.2 DEVIATED NASAL SEPTUM: ICD-10-CM

## 2025-08-06 DIAGNOSIS — H60.63 UNSPECIFIED CHRONIC OTITIS EXTERNA, BILATERAL: ICD-10-CM

## 2025-08-06 DIAGNOSIS — J31.0 CHRONIC RHINITIS: ICD-10-CM

## 2025-08-12 ENCOUNTER — APPOINTMENT (OUTPATIENT)
Dept: CARDIOLOGY | Facility: CLINIC | Age: 70
End: 2025-08-12

## 2025-08-12 RX ORDER — CEPHALEXIN 500 MG/1
500 CAPSULE ORAL 3 TIMES DAILY
Qty: 15 | Refills: 0 | Status: ACTIVE | COMMUNITY
Start: 2025-08-12 | End: 1900-01-01

## 2025-08-15 RX ORDER — ATORVASTATIN CALCIUM 80 MG/1
80 TABLET, FILM COATED ORAL
Qty: 30 | Refills: 0 | Status: ACTIVE | COMMUNITY
Start: 2025-07-23 | End: 1900-01-01

## 2025-08-15 RX ORDER — PANTOPRAZOLE 40 MG/1
40 TABLET, DELAYED RELEASE ORAL DAILY
Qty: 30 | Refills: 0 | Status: ACTIVE | COMMUNITY
Start: 2025-07-23 | End: 1900-01-01

## 2025-08-21 ENCOUNTER — TRANSCRIPTION ENCOUNTER (OUTPATIENT)
Age: 70
End: 2025-08-21

## 2025-09-02 ENCOUNTER — APPOINTMENT (OUTPATIENT)
Dept: ORTHOPEDIC SURGERY | Facility: CLINIC | Age: 70
End: 2025-09-02

## 2025-09-20 ENCOUNTER — RESULT REVIEW (OUTPATIENT)
Age: 70
End: 2025-09-20

## (undated) DEVICE — SYNOVIS VASCULAR PROBE 1.5MM 15CM

## (undated) DEVICE — SUT PROLENE 8-0 24" BV175-6

## (undated) DEVICE — SUT BIOSYN 4-0 18" P-12

## (undated) DEVICE — PACK UNIVERSAL CARDIAC

## (undated) DEVICE — SOL IRR POUR H2O 250ML

## (undated) DEVICE — DRSG STERISTRIPS 0.5 X 4"

## (undated) DEVICE — VESSEL LOOP MAXI-BLUE 0.120" X 16"

## (undated) DEVICE — SUCTION YANKAUER NO CONTROL VENT

## (undated) DEVICE — SYR LUER LOK 20CC

## (undated) DEVICE — SOL IRR POUR NS 0.9% 500ML

## (undated) DEVICE — SUT PROLENE 6-0 4-30" C-1

## (undated) DEVICE — SUT PROLENE 6-0 4-30" BV-1

## (undated) DEVICE — STEALTH CLAMP INSERT FIBRA/FIBRA 60MM

## (undated) DEVICE — ELCTR BOVIE TIP BLADE MEGADYNE E-Z CLEAN 6.5" (LONG)

## (undated) DEVICE — DRSG STOCKINETTE IMPERVIOUS XL 12 X 48"

## (undated) DEVICE — Device

## (undated) DEVICE — AORTIC PUNCH 5MM STANDARD HANDLE

## (undated) DEVICE — STEALTH CLAMP INSERT FIBRA/FIBRA 90MM

## (undated) DEVICE — SET PERF Y TYPE 13.5IN STRL

## (undated) DEVICE — DRSG TEGADERM 6 X 8"

## (undated) DEVICE — MARKING PEN W RULER

## (undated) DEVICE — SOL NORMOSOL-R PH7.4 1000ML

## (undated) DEVICE — LAP PAD 18 X 18"

## (undated) DEVICE — SUT SOFSILK 0 30" TIES

## (undated) DEVICE — SPECIMEN CONTAINER 100ML

## (undated) DEVICE — TUBING SUCTION 20FT

## (undated) DEVICE — SOL ANTI FOG (FRED)

## (undated) DEVICE — CHEST DRAIN PLEUR-EVAC WET/WET ADULT-PEDS SINGLE (QUICK)

## (undated) DEVICE — POSITIONER CARDIAC BUMP

## (undated) DEVICE — SUCTION CATH ARGYLE WHISTLE TIP 14FR STRAIGHT PACKED

## (undated) DEVICE — CHEST DRAIN OASIS DRY SUCTION WATER SEAL

## (undated) DEVICE — TUBING TUR 2 PRONG

## (undated) DEVICE — PREP DURAPREP 26CC

## (undated) DEVICE — SUT VICRYL 0 36" CTX UNDYED

## (undated) DEVICE — DRAPE IOBAN 23" X 23"

## (undated) DEVICE — SUT POLYSORB 0 36" GS-25 UNDYED

## (undated) DEVICE — BLADE SCALPEL SAFETYLOCK #15

## (undated) DEVICE — DRSG DERMABOND PRINEO 60CM

## (undated) DEVICE — WARMING BLANKET DUO-THERM HYPER/HYPOTHERM ADULT

## (undated) DEVICE — GOWN XXXL

## (undated) DEVICE — STRYKER PULSE LAVAGE WITH HIGH FLOW TIP

## (undated) DEVICE — DRAIN CHANNEL 19FR ROUND FULL FLUTED

## (undated) DEVICE — DRAPE 3/4 SHEET W REINFORCEMENT 56X77"

## (undated) DEVICE — VESSEL LOOP MAXI-RED  0.120" X 16"

## (undated) DEVICE — SUT POLYSORB 2-0 30" GS-26

## (undated) DEVICE — MEDTRONIC CLEARVIEW BLOWER MISTER KIT W TUBING SET

## (undated) DEVICE — SENSOR MYOCARDIAL TEMP 15MM

## (undated) DEVICE — DRAIN RESERVOIR FOR JACKSON PRATT 100CC CARDINAL

## (undated) DEVICE — SAW BLADE STRYKER STERNUM 31MM X 6.27 X .79

## (undated) DEVICE — SUT PROLENE 7-0 4-24" BV-1

## (undated) DEVICE — BULLDOG SPRING CLIP 6MM SOFT/SOFT

## (undated) DEVICE — SUT SOFSILK 2 60" TIES

## (undated) DEVICE — GOWN TRIMAX LG

## (undated) DEVICE — SUT PLEDGET 9MM X 4MM X 1.5MM

## (undated) DEVICE — PAD NERVE PHRENIC NERVE

## (undated) DEVICE — DRAPE SLUSH MACHINE 48" X 48"

## (undated) DEVICE — SUMP PERICARDIAL 20FR 1/4" ADULT

## (undated) DEVICE — WOUND IRR IRRISEPT W 0.5 CHG

## (undated) DEVICE — STAPLER SKIN VISI-STAT 35 WIDE

## (undated) DEVICE — FOLEY TRAY 16FR 5CC LF LUBRISIL ADVANCE TEMP CLOSED

## (undated) DEVICE — TUBING ATS SUCTION LINE

## (undated) DEVICE — SUT PROLENE 7-0 24" BV175-6

## (undated) DEVICE — GOWN TRIMAX XXL

## (undated) DEVICE — PROBE MYOCARDIAL TEMPERATURE 30MM

## (undated) DEVICE — SOL IRR BAG NS 0.9% 3000ML

## (undated) DEVICE — BLADE STERNUM 32X6.3X5.8MM

## (undated) DEVICE — NDL COUNTER FOAM AND MAGNET 40-70

## (undated) DEVICE — SUT SOFSILK 0 30" V-20

## (undated) DEVICE — SUT SOFSILK 0 18" TIES

## (undated) DEVICE — SUT PLEDGET PRE PUNCH 4.8 X 9.5 X 1.5 MM

## (undated) DEVICE — ELCTR BOVIE TIP BLADE VALLEYLAB 6.5"

## (undated) DEVICE — SUT PROLENE 4-0 36" BB

## (undated) DEVICE — DRSG OPSITE 2.5 X 2"

## (undated) DEVICE — PACK CARDIAC YELLOW

## (undated) DEVICE — GUIDE SELECTION F/ATRICLIP LAA SYS

## (undated) DEVICE — TOURNIQUET SET 12FR (1 RED, 1 BLUE, 1 SNARE) 7"

## (undated) DEVICE — POSITIONER FOAM EGG CRATE ULNAR 2PCS (PINK)

## (undated) DEVICE — VISITEC 4X4

## (undated) DEVICE — SUT PROLENE 6-0 30" C-1

## (undated) DEVICE — BLADE SCALPEL SAFETYLOCK #11

## (undated) DEVICE — SUT BOOT STANDARD (ASSORTED) 5 PAIR

## (undated) DEVICE — ELCTR BOVIE PENCIL HANDPIECE ROCKER SWITCH 15FT

## (undated) DEVICE — DRSG ACE BANDAGE 6"

## (undated) DEVICE — SPONGE PEANUT AUTO COUNT

## (undated) DEVICE — MULTIPLE PERFUSION SET FEMALE 1 INLET LEG W 4 LEGS 15" (BLUE/RED)

## (undated) DEVICE — BLADE SCALPEL SAFETYLOCK #10

## (undated) DEVICE — DRAPE MAYO STAND 30"

## (undated) DEVICE — SUT PROLENE 4-0 36" SH

## (undated) DEVICE — BLOWER MISTER VIPER II

## (undated) DEVICE — PACING CABLE (BROWN) A/V TEMP SCREW DOWN 12FT

## (undated) DEVICE — CONNECTOR "Y" 1/2 X 3/8 X 3/8"

## (undated) DEVICE — DRAIN CHANNEL 32FR ROUND HUBLESS FULL FLUTED

## (undated) DEVICE — DRSG PREVENA PEEL & PLACE KIT 20CM

## (undated) DEVICE — SYR ASEPTO

## (undated) DEVICE — SUT PROLENE 6-0 30" BV-1

## (undated) DEVICE — DRAPE TOWEL BLUE 17" X 24"

## (undated) DEVICE — DRAPE SLUSH / WARMER 44 X 66"

## (undated) DEVICE — TUBING INSUFFLATION LAP FILTER 10FT

## (undated) DEVICE — SUT QUILL MONODERM 2-0 30CM 18MM

## (undated) DEVICE — SUT TICRON 5 30" KV-40

## (undated) DEVICE — SUT BLUNT SZ 5

## (undated) DEVICE — SUT POLYSORB 2-0 30" GS-21 UNDYED

## (undated) DEVICE — DRAPE INSTRUMENT POUCH 6.75" X 11"

## (undated) DEVICE — CONNECTOR INTERSEPT "Y" 1/4 X 1/4 X 1/4"

## (undated) DEVICE — NDL BLUNT 18G LOOP VESSEL MAXI WHITE

## (undated) DEVICE — CONNECTOR STRAIGHT 3/8 X 1/2"

## (undated) DEVICE — SUT SURGICAL STEEL 6 30" BP-1

## (undated) DEVICE — CATH IV SAFE INSYTE 24G X 3/4" (YELLOW)

## (undated) DEVICE — SUT DOUBLE 6 WIRE STERNAL

## (undated) DEVICE — SUT VICRYL 2-0 27" CT-1 UNDYED

## (undated) DEVICE — FEEDING TUBE NG SUMP 16FR 48"

## (undated) DEVICE — DRSG KLING 6"

## (undated) DEVICE — SUT SOFSILK 4-0 30" TIES

## (undated) DEVICE — DRSG OPSITE 13.75 X 4"

## (undated) DEVICE — TUBING LAPAROSCOPIC 3/8INX10FT

## (undated) DEVICE — SUT PROLENE 3-0 36" SH

## (undated) DEVICE — SUT PROLENE 4-0 36" RB-1

## (undated) DEVICE — SUT POLYSORB 4-0 30" CVF-23

## (undated) DEVICE — DRAPE 1/2 SHEET 40X57"

## (undated) DEVICE — SUT PROLENE 7-0 24" BV-1

## (undated) DEVICE — VASOVIEW HEMOPRO 2

## (undated) DEVICE — DRAPE IOBAN 33" X 23"

## (undated) DEVICE — SUT QUILL MONODERM 3-0 30CM PS-2

## (undated) DEVICE — BEAVER BLADE MINI SHARP ALL ROUND (BLUE)

## (undated) DEVICE — ELCTR BOVIE TIP BLADE MEGADYNE E-Z CLEAN 2.5" (SHORT)

## (undated) DEVICE — SUT STAINLESS STEEL 5 18" SCC